# Patient Record
Sex: MALE | Race: WHITE | NOT HISPANIC OR LATINO | Employment: FULL TIME | URBAN - METROPOLITAN AREA
[De-identification: names, ages, dates, MRNs, and addresses within clinical notes are randomized per-mention and may not be internally consistent; named-entity substitution may affect disease eponyms.]

---

## 2020-03-05 ENCOUNTER — HOSPITAL ENCOUNTER (INPATIENT)
Facility: HOSPITAL | Age: 55
LOS: 2 days | Discharge: HOME | DRG: 315 | End: 2020-03-09
Attending: EMERGENCY MEDICINE | Admitting: FAMILY MEDICINE
Payer: COMMERCIAL

## 2020-03-05 ENCOUNTER — APPOINTMENT (EMERGENCY)
Dept: CT IMAGING | Facility: HOSPITAL | Age: 55
DRG: 315 | End: 2020-03-05
Payer: COMMERCIAL

## 2020-03-05 ENCOUNTER — APPOINTMENT (OUTPATIENT)
Dept: CT IMAGING | Facility: HOSPITAL | Age: 55
DRG: 315 | End: 2020-03-05
Payer: COMMERCIAL

## 2020-03-05 DIAGNOSIS — F10.10 ALCOHOL ABUSE: ICD-10-CM

## 2020-03-05 DIAGNOSIS — I42.9 CARDIOMYOPATHY (HCC): ICD-10-CM

## 2020-03-05 DIAGNOSIS — R55 SYNCOPE: Primary | ICD-10-CM

## 2020-03-05 DIAGNOSIS — F10.239 ALCOHOL WITHDRAWAL (HCC): ICD-10-CM

## 2020-03-05 DIAGNOSIS — R74.01 TRANSAMINITIS: ICD-10-CM

## 2020-03-05 DIAGNOSIS — R06.00 DYSPNEA: ICD-10-CM

## 2020-03-05 PROBLEM — S60.561A INSECT BITE OF RIGHT HAND: Status: ACTIVE | Noted: 2020-03-05

## 2020-03-05 PROBLEM — R42 LIGHT-HEADEDNESS: Status: ACTIVE | Noted: 2020-03-05
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
## 2020-03-05 LAB
ALBUMIN SERPL BCP-MCNC: 3.5 G/DL (ref 3.5–5)
ALP SERPL-CCNC: 59 U/L (ref 46–116)
ALT SERPL W P-5'-P-CCNC: 88 U/L (ref 12–78)
ANION GAP SERPL CALCULATED.3IONS-SCNC: 11 MMOL/L (ref 4–13)
AST SERPL W P-5'-P-CCNC: 130 U/L (ref 5–45)
ATRIAL RATE: 61 BPM
BASOPHILS # BLD AUTO: 0.12 THOUSANDS/ΜL (ref 0–0.1)
BASOPHILS NFR BLD AUTO: 3 % (ref 0–1)
BILIRUB SERPL-MCNC: 0.8 MG/DL (ref 0.2–1)
BUN SERPL-MCNC: 12 MG/DL (ref 5–25)
CALCIUM SERPL-MCNC: 8.1 MG/DL (ref 8.3–10.1)
CHLORIDE SERPL-SCNC: 107 MMOL/L (ref 100–108)
CO2 SERPL-SCNC: 28 MMOL/L (ref 21–32)
CREAT SERPL-MCNC: 0.7 MG/DL (ref 0.6–1.3)
D DIMER PPP FEU-MCNC: 1.26 UG/ML FEU
EOSINOPHIL # BLD AUTO: 0.16 THOUSAND/ΜL (ref 0–0.61)
EOSINOPHIL NFR BLD AUTO: 4 % (ref 0–6)
ERYTHROCYTE [DISTWIDTH] IN BLOOD BY AUTOMATED COUNT: 15 % (ref 11.6–15.1)
GFR SERPL CREATININE-BSD FRML MDRD: 109 ML/MIN/1.73SQ M
GLUCOSE SERPL-MCNC: 86 MG/DL (ref 65–140)
HCT VFR BLD AUTO: 41 % (ref 36.5–49.3)
HGB BLD-MCNC: 13.8 G/DL (ref 12–17)
IMM GRANULOCYTES # BLD AUTO: 0.01 THOUSAND/UL (ref 0–0.2)
IMM GRANULOCYTES NFR BLD AUTO: 0 % (ref 0–2)
LYMPHOCYTES # BLD AUTO: 1.61 THOUSANDS/ΜL (ref 0.6–4.47)
LYMPHOCYTES NFR BLD AUTO: 37 % (ref 14–44)
MCH RBC QN AUTO: 35.3 PG (ref 26.8–34.3)
MCHC RBC AUTO-ENTMCNC: 33.7 G/DL (ref 31.4–37.4)
MCV RBC AUTO: 105 FL (ref 82–98)
MONOCYTES # BLD AUTO: 0.37 THOUSAND/ΜL (ref 0.17–1.22)
MONOCYTES NFR BLD AUTO: 9 % (ref 4–12)
NEUTROPHILS # BLD AUTO: 2.1 THOUSANDS/ΜL (ref 1.85–7.62)
NEUTS SEG NFR BLD AUTO: 47 % (ref 43–75)
NRBC BLD AUTO-RTO: 0 /100 WBCS
NT-PROBNP SERPL-MCNC: 59 PG/ML
P AXIS: 63 DEGREES
PLATELET # BLD AUTO: 120 THOUSANDS/UL (ref 149–390)
PMV BLD AUTO: 10.1 FL (ref 8.9–12.7)
POTASSIUM SERPL-SCNC: 4.1 MMOL/L (ref 3.5–5.3)
PR INTERVAL: 156 MS
PROT SERPL-MCNC: 6.8 G/DL (ref 6.4–8.2)
QRS AXIS: 88 DEGREES
QRSD INTERVAL: 104 MS
QT INTERVAL: 458 MS
QTC INTERVAL: 461 MS
RBC # BLD AUTO: 3.91 MILLION/UL (ref 3.88–5.62)
SODIUM SERPL-SCNC: 146 MMOL/L (ref 136–145)
T WAVE AXIS: 30 DEGREES
TROPONIN I SERPL-MCNC: 0.02 NG/ML
TROPONIN I SERPL-MCNC: <0.02 NG/ML
TROPONIN I SERPL-MCNC: <0.02 NG/ML
TSH SERPL DL<=0.05 MIU/L-ACNC: 0.75 UIU/ML (ref 0.36–3.74)
VENTRICULAR RATE: 61 BPM
WBC # BLD AUTO: 4.37 THOUSAND/UL (ref 4.31–10.16)

## 2020-03-05 PROCEDURE — 93005 ELECTROCARDIOGRAM TRACING: CPT

## 2020-03-05 PROCEDURE — 84484 ASSAY OF TROPONIN QUANT: CPT | Performed by: EMERGENCY MEDICINE

## 2020-03-05 PROCEDURE — 84443 ASSAY THYROID STIM HORMONE: CPT | Performed by: EMERGENCY MEDICINE

## 2020-03-05 PROCEDURE — 99285 EMERGENCY DEPT VISIT HI MDM: CPT

## 2020-03-05 PROCEDURE — 85379 FIBRIN DEGRADATION QUANT: CPT | Performed by: EMERGENCY MEDICINE

## 2020-03-05 PROCEDURE — 99285 EMERGENCY DEPT VISIT HI MDM: CPT | Performed by: EMERGENCY MEDICINE

## 2020-03-05 PROCEDURE — 90471 IMMUNIZATION ADMIN: CPT | Performed by: INTERNAL MEDICINE

## 2020-03-05 PROCEDURE — 85025 COMPLETE CBC W/AUTO DIFF WBC: CPT | Performed by: EMERGENCY MEDICINE

## 2020-03-05 PROCEDURE — 84484 ASSAY OF TROPONIN QUANT: CPT | Performed by: PHYSICIAN ASSISTANT

## 2020-03-05 PROCEDURE — 36415 COLL VENOUS BLD VENIPUNCTURE: CPT | Performed by: EMERGENCY MEDICINE

## 2020-03-05 PROCEDURE — 99220 PR INITIAL OBSERVATION CARE/DAY 70 MINUTES: CPT | Performed by: PHYSICIAN ASSISTANT

## 2020-03-05 PROCEDURE — 71275 CT ANGIOGRAPHY CHEST: CPT

## 2020-03-05 PROCEDURE — 90682 RIV4 VACC RECOMBINANT DNA IM: CPT | Performed by: INTERNAL MEDICINE

## 2020-03-05 PROCEDURE — 80053 COMPREHEN METABOLIC PANEL: CPT | Performed by: EMERGENCY MEDICINE

## 2020-03-05 PROCEDURE — 93010 ELECTROCARDIOGRAM REPORT: CPT | Performed by: INTERNAL MEDICINE

## 2020-03-05 PROCEDURE — 83880 ASSAY OF NATRIURETIC PEPTIDE: CPT | Performed by: EMERGENCY MEDICINE

## 2020-03-05 RX ORDER — BISACODYL 10 MG
10 SUPPOSITORY, RECTAL RECTAL DAILY PRN
Status: DISCONTINUED | OUTPATIENT
Start: 2020-03-05 | End: 2020-03-09 | Stop reason: HOSPADM

## 2020-03-05 RX ORDER — ONDANSETRON 2 MG/ML
4 INJECTION INTRAMUSCULAR; INTRAVENOUS EVERY 6 HOURS PRN
Status: DISCONTINUED | OUTPATIENT
Start: 2020-03-05 | End: 2020-03-09 | Stop reason: HOSPADM

## 2020-03-05 RX ORDER — THIAMINE MONONITRATE (VIT B1) 100 MG
100 TABLET ORAL DAILY
Status: DISCONTINUED | OUTPATIENT
Start: 2020-03-06 | End: 2020-03-09 | Stop reason: HOSPADM

## 2020-03-05 RX ORDER — ALBUTEROL SULFATE 2.5 MG/3ML
2.5 SOLUTION RESPIRATORY (INHALATION) EVERY 6 HOURS PRN
Status: DISCONTINUED | OUTPATIENT
Start: 2020-03-05 | End: 2020-03-05

## 2020-03-05 RX ORDER — FOLIC ACID 1 MG/1
1 TABLET ORAL DAILY
Status: DISCONTINUED | OUTPATIENT
Start: 2020-03-06 | End: 2020-03-09 | Stop reason: HOSPADM

## 2020-03-05 RX ORDER — NICOTINE 21 MG/24HR
1 PATCH, TRANSDERMAL 24 HOURS TRANSDERMAL DAILY
Status: DISCONTINUED | OUTPATIENT
Start: 2020-03-06 | End: 2020-03-05

## 2020-03-05 RX ORDER — SODIUM CHLORIDE, SODIUM GLUCONATE, SODIUM ACETATE, POTASSIUM CHLORIDE, MAGNESIUM CHLORIDE, SODIUM PHOSPHATE, DIBASIC, AND POTASSIUM PHOSPHATE .53; .5; .37; .037; .03; .012; .00082 G/100ML; G/100ML; G/100ML; G/100ML; G/100ML; G/100ML; G/100ML
100 INJECTION, SOLUTION INTRAVENOUS CONTINUOUS
Status: DISCONTINUED | OUTPATIENT
Start: 2020-03-05 | End: 2020-03-09

## 2020-03-05 RX ORDER — MECLIZINE HYDROCHLORIDE 25 MG/1
25 TABLET ORAL EVERY 8 HOURS PRN
Status: DISCONTINUED | OUTPATIENT
Start: 2020-03-05 | End: 2020-03-09 | Stop reason: HOSPADM

## 2020-03-05 RX ORDER — NICOTINE 21 MG/24HR
21 PATCH, TRANSDERMAL 24 HOURS TRANSDERMAL DAILY
Status: DISCONTINUED | OUTPATIENT
Start: 2020-03-05 | End: 2020-03-09 | Stop reason: HOSPADM

## 2020-03-05 RX ORDER — CHLORDIAZEPOXIDE HYDROCHLORIDE 25 MG/1
25 CAPSULE, GELATIN COATED ORAL EVERY 6 HOURS SCHEDULED
Status: DISCONTINUED | OUTPATIENT
Start: 2020-03-06 | End: 2020-03-08

## 2020-03-05 RX ORDER — LORAZEPAM 1 MG/1
1 TABLET ORAL ONCE
Status: COMPLETED | OUTPATIENT
Start: 2020-03-05 | End: 2020-03-05

## 2020-03-05 RX ORDER — CALCIUM CARBONATE 200(500)MG
1000 TABLET,CHEWABLE ORAL DAILY PRN
Status: DISCONTINUED | OUTPATIENT
Start: 2020-03-05 | End: 2020-03-09 | Stop reason: HOSPADM

## 2020-03-05 RX ORDER — LORAZEPAM 0.5 MG/1
0.5 TABLET ORAL EVERY 8 HOURS PRN
Status: DISCONTINUED | OUTPATIENT
Start: 2020-03-05 | End: 2020-03-09 | Stop reason: HOSPADM

## 2020-03-05 RX ADMIN — NICOTINE 21 MG: 21 PATCH TRANSDERMAL at 22:07

## 2020-03-05 RX ADMIN — CHLORDIAZEPOXIDE HYDROCHLORIDE 25 MG: 25 CAPSULE ORAL at 23:33

## 2020-03-05 RX ADMIN — IOHEXOL 85 ML: 350 INJECTION, SOLUTION INTRAVENOUS at 18:57

## 2020-03-05 RX ADMIN — LORAZEPAM 0.5 MG: 0.5 TABLET ORAL at 22:07

## 2020-03-05 RX ADMIN — SODIUM CHLORIDE, SODIUM GLUCONATE, SODIUM ACETATE, POTASSIUM CHLORIDE AND MAGNESIUM CHLORIDE 100 ML/HR: 526; 502; 368; 37; 30 INJECTION, SOLUTION INTRAVENOUS at 22:02

## 2020-03-05 RX ADMIN — INFLUENZA A VIRUS A/BRISBANE/02/2018 (H1N1) RECOMBINANT HEMAGGLUTININ ANTIGEN, INFLUENZA A VIRUS A/KANSAS/14/2017 (H3N2) RECOMBINANT HEMAGGLUTININ ANTIGEN, INFLUENZA B VIRUS B/PHUKET/3073/2013 RECOMBINANT HEMAGGLUTININ ANTIGEN, AND INFLUENZA B VIRUS B/MARYLAND/15/2016 RECOMBINANT HEMAGGLUTININ ANTIGEN 0.5 ML: 45; 45; 45; 45 INJECTION INTRAMUSCULAR at 21:07

## 2020-03-05 RX ADMIN — LORAZEPAM 1 MG: 1 TABLET ORAL at 13:53

## 2020-03-05 NOTE — ED PROVIDER NOTES
History  Chief Complaint   Patient presents with    Syncope     Pt presents following syncopal episode after waking up this morning, he was at [facility] yesterday and was told he had a low platelet count  Pt reports feeling weak since Monday after a supposed spider bite  HPI     53-year-old male with no prior medical history but has not seen a doctor in 21 years, who presents for evaluation after an episode of syncope  The patient states that he suspects that he was bit by a spider 6 days ago and that all of his symptoms started after that  States that he woke up in the morning 6 days ago with 2 small puncture wounds to the dorsum of the right hand and swelling  He did not have any pain so he did not immediately seek medical care, but when the swelling was not better yesterday he went to [facility]  While there he underwent extensive laboratory workup and CT scan of his head which were negative  Patient was dizzy at the time, but states that since then he has been home he has been getting progressively more dizzy  Describes the dizziness as a feeling of lightheadedness, present with standing but also at rest   This morning when he got up from bed he walked into the bathroom, felt very lightheaded, and fell to his knees, briefly blacking out  Denies hitting his head or headache  Endorses bilateral blurry vision since the onset of his symptoms 6 days ago  No nausea, vomiting, or diarrhea  No blood in his stool or black tarry stools  Denies chest pain, palpitations, or abdominal pain  Patient does endorse shortness of breath that has been worse than usual over the last week, present both at rest and with exertion  He does smoke a pack and half of cigarettes a day  No known cardiac history or history of DVT or PE  None       History reviewed  No pertinent past medical history  History reviewed  No pertinent surgical history      Family History   Problem Relation Age of Onset    Hypertension Mother     Cancer Mother     Hypertension Father     Aortic aneurysm Father     Heart failure Father      I have reviewed and agree with the history as documented  E-Cigarette/Vaping     E-Cigarette/Vaping Substances     Social History     Tobacco Use    Smoking status: Current Every Day Smoker     Packs/day: 2 00     Years: 40 00     Pack years: 80 00     Types: Cigarettes    Smokeless tobacco: Never Used   Substance Use Topics    Alcohol use: Yes     Frequency: 4 or more times a week     Drinks per session: 5 or 6     Comment: 1/2 5th of vodka daily    Drug use: Not Currently       Review of Systems   Constitutional: Negative for chills and fever  HENT: Negative for congestion  Eyes: Positive for visual disturbance  Respiratory: Positive for shortness of breath  Negative for cough  Cardiovascular: Negative for chest pain and leg swelling  Gastrointestinal: Negative for abdominal pain, diarrhea, nausea and vomiting  Genitourinary: Negative for dysuria and frequency  Musculoskeletal: Negative for arthralgias, back pain, neck pain and neck stiffness  Skin: Negative for rash  Neurological: Positive for dizziness, syncope and light-headedness  Negative for facial asymmetry, speech difficulty, weakness, numbness and headaches  Psychiatric/Behavioral: Negative for agitation, behavioral problems and confusion  Physical Exam  Physical Exam   Constitutional: He is oriented to person, place, and time  He appears well-developed and well-nourished  No distress  HENT:   Head: Normocephalic and atraumatic  Right Ear: External ear normal    Left Ear: External ear normal    Nose: Nose normal    Mouth/Throat: Oropharynx is clear and moist    Eyes: Pupils are equal, round, and reactive to light  Conjunctivae and EOM are normal    Visual acuity 20/40 in the R eye, 20/30 in the L eye   Neck: Normal range of motion  Neck supple     Cardiovascular: Normal rate, regular rhythm, normal heart sounds and intact distal pulses  Exam reveals no gallop and no friction rub  No murmur heard  Pulmonary/Chest: Effort normal and breath sounds normal  No respiratory distress  He has no wheezes  He has no rales  Abdominal: Soft  Bowel sounds are normal  He exhibits no distension  There is no tenderness  There is no guarding  Musculoskeletal: Normal range of motion  He exhibits no edema (no calf swelling or tenderness) or deformity  Neurological: He is alert and oriented to person, place, and time  He exhibits normal muscle tone  Face symmetric, tongue midline, 5/5 strength in the proximal and distal upper and lower extremities bilaterally with intact sensation to light touch throughout  CN II-XII intact  Normal finger-to-nose, rapid alternating movements, and heel-to-shin bilaterally  Normal speech, normal gait  No pronator drift  Fine tremor to the bilateral arms  Skin: Skin is warm and dry  He is not diaphoretic    2 tiny puncture marks to the dorsum of the R hand, no swelling or erythema          Vital Signs  ED Triage Vitals [03/05/20 1249]   Temperature Pulse Respirations Blood Pressure SpO2   97 5 °F (36 4 °C) 72 16 128/75 98 %      Temp Source Heart Rate Source Patient Position - Orthostatic VS BP Location FiO2 (%)   Oral Monitor Sitting Left arm --      Pain Score       --           Vitals:    03/05/20 1402 03/05/20 1407 03/05/20 1830 03/05/20 1957   BP: 115/67 115/67 111/63 118/59   Pulse: (!) 52 57 64 56   Patient Position - Orthostatic VS:             Visual Acuity      ED Medications  Medications   LORazepam (ATIVAN) tablet 1 mg (1 mg Oral Given 3/5/20 1353)   iohexol (OMNIPAQUE) 350 MG/ML injection (MULTI-DOSE) 85 mL (85 mL Intravenous Given 3/5/20 1857)       Diagnostic Studies  Results Reviewed     Procedure Component Value Units Date/Time    Troponin I [[id number]]  (Normal) Collected:  03/05/20 1640    Lab Status:  Final result Specimen:  Blood from Arm, Left Updated: 03/05/20 1927     Troponin I <0 02 ng/mL     Troponin I [[id number]]     Lab Status:  No result Specimen:  Blood     D-dimer, quantitative [[id number]]  (Abnormal) Collected:  03/05/20 1445    Lab Status:  Final result Specimen:  Blood from Arm, Left Updated:  03/05/20 1506     D-Dimer, Quant 1 26 ug/ml FEU     NT-BNP PRO [[id number]]  (Normal) Collected:  03/05/20 1359    Lab Status:  Final result Specimen:  Blood from Arm, Right Updated:  03/05/20 1431     NT-proBNP 59 pg/mL     TSH [[id number]]  (Normal) Collected:  03/05/20 1359    Lab Status:  Final result Specimen:  Blood from Arm, Right Updated:  03/05/20 1431     TSH 3RD GENERATON 0 751 uIU/mL     Narrative:       Patients undergoing fluorescein dye angiography may retain small amounts of fluorescein in the body for 48-72 hours post procedure  Samples containing fluorescein can produce falsely depressed TSH values  If the patient had this procedure,a specimen should be resubmitted post fluorescein clearance        Troponin I [[id number]]  (Normal) Collected:  03/05/20 1359    Lab Status:  Final result Specimen:  Blood from Arm, Right Updated:  03/05/20 1424     Troponin I <0 02 ng/mL     Comprehensive metabolic panel [[id number]]  (Abnormal) Collected:  03/05/20 1359    Lab Status:  Final result Specimen:  Blood from Arm, Right Updated:  03/05/20 1422     Sodium 146 mmol/L      Potassium 4 1 mmol/L      Chloride 107 mmol/L      CO2 28 mmol/L      ANION GAP 11 mmol/L      BUN 12 mg/dL      Creatinine 0 70 mg/dL      Glucose 86 mg/dL      Calcium 8 1 mg/dL       U/L      ALT 88 U/L      Alkaline Phosphatase 59 U/L      Total Protein 6 8 g/dL      Albumin 3 5 g/dL      Total Bilirubin 0 80 mg/dL      eGFR 109 ml/min/1 73sq m     Narrative:       [place] guidelines for Chronic Kidney Disease (CKD):     Stage 1 with normal or high GFR (GFR > 90 mL/min/1 73 square meters)    Stage 2 Mild CKD (GFR = 60-89 mL/min/1 73 square meters)   Stage 3A Moderate CKD (GFR = 45-59 mL/min/1 73 square meters)    Stage 3B Moderate CKD (GFR = 30-44 mL/min/1 73 square meters)    Stage 4 Severe CKD (GFR = 15-29 mL/min/1 73 square meters)    Stage 5 End Stage CKD (GFR <15 mL/min/1 73 square meters)  Note: GFR calculation is accurate only with a steady state creatinine    CBC and differential [[id number]]  (Abnormal) Collected:  03/05/20 1359    Lab Status:  Final result Specimen:  Blood from Arm, Right Updated:  03/05/20 1409     WBC 4 37 Thousand/uL      RBC 3 91 Million/uL      Hemoglobin 13 8 g/dL      Hematocrit 41 0 %       fL      MCH 35 3 pg      MCHC 33 7 g/dL      RDW 15 0 %      MPV 10 1 fL      Platelets 629 Thousands/uL      nRBC 0 /100 WBCs      Neutrophils Relative 47 %      Immat GRANS % 0 %      Lymphocytes Relative 37 %      Monocytes Relative 9 %      Eosinophils Relative 4 %      Basophils Relative 3 %      Neutrophils Absolute 2 10 Thousands/µL      Immature Grans Absolute 0 01 Thousand/uL      Lymphocytes Absolute 1 61 Thousands/µL      Monocytes Absolute 0 37 Thousand/µL      Eosinophils Absolute 0 16 Thousand/µL      Basophils Absolute 0 12 Thousands/µL                  CTA ED chest PE Study    (Results Pending)              Procedures  Procedures         ED Course         HEART Risk Score      Most Recent Value   Heart Score Risk Calculator   History  0 Filed at: 03/05/2020 1527   ECG  1 Filed at: 03/05/2020 1527   Age  1 Filed at: 03/05/2020 1527   Risk Factors  2 Filed at: 03/05/2020 1527   Troponin  0 Filed at: 03/05/2020 1527   HEART Score  4 Filed at: 03/05/2020 1527                      Wells' Criteria for PE      Most Recent Value   Wells' Criteria for PE   Clinical signs and symptoms of DVT  0 Filed at: 03/05/2020 1527   PE is primary diagnosis or equally likely  0 Filed at: 03/05/2020 1527   HR >100  0 Filed at: 03/05/2020 1527   Immobilization at least 3 days or Surgery in the previous 4 weeks  0 Filed at: 03/05/2020 1527 Previous, objectively diagnosed PE or DVT  0 Filed at: 03/05/2020 1527   Hemoptysis  0 Filed at: 03/05/2020 1527   Malignancy with treatment within 6 months or palliative  0 Filed at: 03/05/2020 1527   Wells' Criteria Total  0 Filed at: 03/05/2020 1527            [facility]  Number of Diagnoses or Management Options  Dyspnea: new and requires workup  Syncope: new and requires workup  Diagnosis management comments: Afebrile and HDS  Continues to report persistent lightheadedness  I personally interpreted the pt's EKG, which reveals normal rate, NSR, normal axis, normal intervals, Q waves in leads aVL, V1, and V2, with no prior available for comparison  No ST segment deviation  No evidence of WPW, brugada, or HOCM  Pt denies CP  Delta troponins undetectable, doubt acute ischemia  Neuro exam unremarkable, pt denies head strike today, CT head from outside hospital yesterday reviewed, with Krysta Bosch  Doubt intracranial process as the cause of his symptoms  Labs remarkable for transaminitis with AST>ALT, likely 2/2 to patient's chronic alcohol use  He is mildly tremulous on exam, no other evidence of alcohol withdrawal  1 mg of ativan given and pt placed on CIWA protocol  Given shortness of breath and lightheadedness, CT PE study ordered to r/o PE as the cause of the pt's symptoms  Pt admitted to medicine for obs for further evaluation of syncope with persistent lightheadedness            Amount and/or Complexity of Data Reviewed  Clinical lab tests: ordered and reviewed  Tests in the radiology section of CPT®: ordered  Discuss the patient with other providers: yes  Independent visualization of images, tracings, or specimens: yes    Patient Progress  Patient progress: stable        Disposition  Final diagnoses:   Syncope   Dyspnea     Time reflects when diagnosis was documented in both MDM as applicable and the Disposition within this note     Time User Action Codes Description Comment    3/5/2020  6:41 PM Guy Aguiar Add [R55] Syncope     3/5/2020  6:41 PM Hollis DE LA TORRE Add [R06 00] Dyspnea       ED Disposition     ED Disposition Condition Date/Time Comment    Admit Stable Thu Mar 5, 2020  6:41 PM Case was discussed with Aam Montes De Oca and the patient's admission status was agreed to be Admission Status: observation status to the service of Dr Ama Montes De Oca   Follow-up Information    None         There are no discharge medications for this patient  No discharge procedures on file      PDMP Review     None          ED Provider  Electronically Signed by           Dong Callahan MD  03/05/20 2010       Dong Callahan MD  03/05/20 2020

## 2020-03-06 ENCOUNTER — APPOINTMENT (OUTPATIENT)
Dept: ULTRASOUND IMAGING | Facility: HOSPITAL | Age: 55
DRG: 315 | End: 2020-03-06
Payer: COMMERCIAL

## 2020-03-06 LAB
ALBUMIN SERPL BCP-MCNC: 3 G/DL (ref 3.5–5)
ALP SERPL-CCNC: 63 U/L (ref 46–116)
ALT SERPL W P-5'-P-CCNC: 73 U/L (ref 12–78)
ANION GAP SERPL CALCULATED.3IONS-SCNC: 8 MMOL/L (ref 4–13)
AST SERPL W P-5'-P-CCNC: 92 U/L (ref 5–45)
BASOPHILS # BLD AUTO: 0.09 THOUSANDS/ΜL (ref 0–0.1)
BASOPHILS NFR BLD AUTO: 2 % (ref 0–1)
BILIRUB DIRECT SERPL-MCNC: 0.32 MG/DL (ref 0–0.2)
BILIRUB SERPL-MCNC: 1.6 MG/DL (ref 0.2–1)
BUN SERPL-MCNC: 15 MG/DL (ref 5–25)
CALCIUM SERPL-MCNC: 8.5 MG/DL (ref 8.3–10.1)
CHLORIDE SERPL-SCNC: 102 MMOL/L (ref 100–108)
CO2 SERPL-SCNC: 29 MMOL/L (ref 21–32)
CREAT SERPL-MCNC: 0.84 MG/DL (ref 0.6–1.3)
EOSINOPHIL # BLD AUTO: 0.18 THOUSAND/ΜL (ref 0–0.61)
EOSINOPHIL NFR BLD AUTO: 4 % (ref 0–6)
ERYTHROCYTE [DISTWIDTH] IN BLOOD BY AUTOMATED COUNT: 14.3 % (ref 11.6–15.1)
GFR SERPL CREATININE-BSD FRML MDRD: 101 ML/MIN/1.73SQ M
GLUCOSE SERPL-MCNC: 95 MG/DL (ref 65–140)
HCT VFR BLD AUTO: 38.7 % (ref 36.5–49.3)
HGB BLD-MCNC: 13.4 G/DL (ref 12–17)
IMM GRANULOCYTES # BLD AUTO: 0.02 THOUSAND/UL (ref 0–0.2)
IMM GRANULOCYTES NFR BLD AUTO: 1 % (ref 0–2)
LYMPHOCYTES # BLD AUTO: 1.05 THOUSANDS/ΜL (ref 0.6–4.47)
LYMPHOCYTES NFR BLD AUTO: 24 % (ref 14–44)
MAGNESIUM SERPL-MCNC: 1.6 MG/DL (ref 1.6–2.6)
MCH RBC QN AUTO: 35.6 PG (ref 26.8–34.3)
MCHC RBC AUTO-ENTMCNC: 34.6 G/DL (ref 31.4–37.4)
MCV RBC AUTO: 103 FL (ref 82–98)
MONOCYTES # BLD AUTO: 0.6 THOUSAND/ΜL (ref 0.17–1.22)
MONOCYTES NFR BLD AUTO: 14 % (ref 4–12)
NEUTROPHILS # BLD AUTO: 2.48 THOUSANDS/ΜL (ref 1.85–7.62)
NEUTS SEG NFR BLD AUTO: 55 % (ref 43–75)
NRBC BLD AUTO-RTO: 0 /100 WBCS
PLATELET # BLD AUTO: 109 THOUSANDS/UL (ref 149–390)
PMV BLD AUTO: 10.5 FL (ref 8.9–12.7)
POTASSIUM SERPL-SCNC: 3.9 MMOL/L (ref 3.5–5.3)
PROT SERPL-MCNC: 6.2 G/DL (ref 6.4–8.2)
RBC # BLD AUTO: 3.76 MILLION/UL (ref 3.88–5.62)
SODIUM SERPL-SCNC: 139 MMOL/L (ref 136–145)
WBC # BLD AUTO: 4.42 THOUSAND/UL (ref 4.31–10.16)

## 2020-03-06 PROCEDURE — 99244 OFF/OP CNSLTJ NEW/EST MOD 40: CPT | Performed by: INTERNAL MEDICINE

## 2020-03-06 PROCEDURE — 93880 EXTRACRANIAL BILAT STUDY: CPT

## 2020-03-06 PROCEDURE — 94762 N-INVAS EAR/PLS OXIMTRY CONT: CPT

## 2020-03-06 PROCEDURE — 99225 PR SBSQ OBSERVATION CARE/DAY 25 MINUTES: CPT | Performed by: FAMILY MEDICINE

## 2020-03-06 PROCEDURE — 97163 PT EVAL HIGH COMPLEX 45 MIN: CPT

## 2020-03-06 PROCEDURE — 83735 ASSAY OF MAGNESIUM: CPT | Performed by: PHYSICIAN ASSISTANT

## 2020-03-06 PROCEDURE — 80053 COMPREHEN METABOLIC PANEL: CPT | Performed by: PHYSICIAN ASSISTANT

## 2020-03-06 PROCEDURE — 82248 BILIRUBIN DIRECT: CPT | Performed by: FAMILY MEDICINE

## 2020-03-06 PROCEDURE — 85025 COMPLETE CBC W/AUTO DIFF WBC: CPT | Performed by: PHYSICIAN ASSISTANT

## 2020-03-06 RX ORDER — HEPARIN SODIUM 5000 [USP'U]/ML
5000 INJECTION, SOLUTION INTRAVENOUS; SUBCUTANEOUS EVERY 8 HOURS SCHEDULED
Status: DISCONTINUED | OUTPATIENT
Start: 2020-03-06 | End: 2020-03-09 | Stop reason: HOSPADM

## 2020-03-06 RX ORDER — ACETAMINOPHEN 325 MG/1
650 TABLET ORAL EVERY 6 HOURS PRN
Status: DISCONTINUED | OUTPATIENT
Start: 2020-03-06 | End: 2020-03-09 | Stop reason: HOSPADM

## 2020-03-06 RX ADMIN — HEPARIN SODIUM 5000 UNITS: 5000 INJECTION INTRAVENOUS; SUBCUTANEOUS at 21:33

## 2020-03-06 RX ADMIN — Medication 1 TABLET: at 08:34

## 2020-03-06 RX ADMIN — SODIUM CHLORIDE, SODIUM GLUCONATE, SODIUM ACETATE, POTASSIUM CHLORIDE AND MAGNESIUM CHLORIDE 100 ML/HR: 526; 502; 368; 37; 30 INJECTION, SOLUTION INTRAVENOUS at 06:37

## 2020-03-06 RX ADMIN — LORAZEPAM 0.5 MG: 0.5 TABLET ORAL at 21:33

## 2020-03-06 RX ADMIN — CHLORDIAZEPOXIDE HYDROCHLORIDE 25 MG: 25 CAPSULE ORAL at 04:57

## 2020-03-06 RX ADMIN — NICOTINE 21 MG: 21 PATCH TRANSDERMAL at 21:33

## 2020-03-06 RX ADMIN — CHLORDIAZEPOXIDE HYDROCHLORIDE 25 MG: 25 CAPSULE ORAL at 17:06

## 2020-03-06 RX ADMIN — THIAMINE HCL TAB 100 MG 100 MG: 100 TAB at 08:34

## 2020-03-06 RX ADMIN — LORAZEPAM 0.5 MG: 0.5 TABLET ORAL at 08:34

## 2020-03-06 RX ADMIN — CHLORDIAZEPOXIDE HYDROCHLORIDE 25 MG: 25 CAPSULE ORAL at 12:06

## 2020-03-06 RX ADMIN — FOLIC ACID 1 MG: 1 TABLET ORAL at 08:34

## 2020-03-06 RX ADMIN — HEPARIN SODIUM 5000 UNITS: 5000 INJECTION INTRAVENOUS; SUBCUTANEOUS at 17:36

## 2020-03-06 NOTE — ASSESSMENT & PLAN NOTE
· AST//88 on admission  Denies any abdominal pain or N/V   Likely 2/2 chronic alcoholism  · Repeat CMP in am   · IVF

## 2020-03-06 NOTE — PLAN OF CARE
Problem: PHYSICAL THERAPY ADULT  Goal: Performs mobility at highest level of function for planned discharge setting  See evaluation for individualized goals  Description  Treatment/Interventions: Functional transfer training, LE strengthening/ROM, Elevations, Therapeutic exercise, Endurance training, Patient/family training, Equipment eval/education, Bed mobility, Gait training, Spoke to nursing, Family          See flowsheet documentation for full assessment, interventions and recommendations  Note:   Prognosis: Good  Problem List: Decreased strength, Decreased endurance, Impaired balance, Decreased mobility  Assessment: pt is a 50y/o m who [facility]p [facility]opal episode  pending cardiology c/s  PMH significant for alcohol abuse, smoker, + transaminitis  at baseline, pt (I) c functional mobility tasks s AD  resides c spouse in ranch home c [place]  currently requires min (A)x1 to complete OOB mobility tasks 2* deficits in strength, balance, gait quality, + activity tolerance noted in PT exam above  Barthel Index 60/100  pt slightly unsteady upon standing requiring cues to prevent loss of balance  ambulated 30' c support of IV pole, however no overt loss of balance noted  demonstrated tremulous gait pattern  would benefit from skilled PT to maximize functional mobility + return home safely  upon d/c, recommend pt return home c family support + follow up c OPPT for balance once medically cleared by MD  PT eval of high complexity 2* unstable med status c pt requiring ongoing medical management 2* syncope  pt c multiple co-morbidities + mobility deficits above requiring (A)x1 to complete OOB mobility tasks  pt tremulous c ambulation increasing fall risk  also pending cardiology c/s  Barriers to Discharge: Inaccessible home environment     Recommendation: Outpatient PT, Home with family support          See flowsheet documentation for full assessment

## 2020-03-06 NOTE — ASSESSMENT & PLAN NOTE
Doubt it was an insect bite  Clinically, appears like a minor dermatitis related rash which is currently resolved  No cellulitis

## 2020-03-06 NOTE — PHYSICAL THERAPY NOTE
PT Evaluation (15min)  (14:10-14:25)    History reviewed  No pertinent past medical history  03/06/20 1416   Note Type   Note type Eval only   Pain Assessment   Pain Assessment No/denies pain   Home Living   Type of [address] One level;Stairs to enter without rails  ([place])   Bathroom Shower/Tub Walk-in shower   Bathroom Toilet Standard   Prior Function   Level of [place] Independent with ADLs and functional mobility   Lives With Spouse   Receives Help From Family   ADL Assistance Independent   IADLs Independent   Falls in the last 6 months 0   Vocational Full time employment   Comments (+) drives   Restrictions/Precautions   Weight Bearing Precautions Per Order No   Other Precautions Multiple lines;Telemetry; Fall Risk   General   Additional Pertinent History pt presents to [facility] s/p near syncopal episode while ambulating at home  pending cardiology c/s  PT consulted for mobility + d/c planning  up c (A)  Family/Caregiver Present Yes  (pt's wife)   Cognition   Orientation Level Oriented X4   RUE Assessment   RUE Assessment WFL  (4/5)   LUE Assessment   LUE Assessment WFL  (4/5)   RLE Assessment   RLE Assessment WFL  (4/5)   LLE Assessment   LLE Assessment WFL  (4/5)   Coordination   Movements are Fluid and Coordinated 1   Sensation WFL   Bed Mobility   Supine to Sit 5  Supervision   Additional items HOB elevated; Increased time required;Verbal cues   Sit to Supine 5  Supervision   Additional items Increased time required;Verbal cues   Transfers   Sit to Stand 4  Minimal assistance   Additional items Assist x 1;Verbal cues; Increased time required  (slightly unsteady upon standing)   Stand to Sit 4  Minimal assistance   Additional items Assist x 1;Verbal cues; Increased time required   Ambulation/Elevation   Gait pattern Narrow OLLIE; Decreased foot clearance; Inconsistent wendy  (unsteady/tremulous)   Gait Assistance 4  Minimal assist   Additional items Assist x 1;Verbal cues   Assistive Device Other (Comment)  (IV pole)   Distance 30'   Stair Management Assistance Not tested   Balance   Static Sitting Fair +   Dynamic Sitting Fair +   Static Standing Fair -   Dynamic Standing Poor +   Ambulatory Poor +   Activity Tolerance   Activity Tolerance Patient limited by fatigue   Nurse Made Aware CHATA Thompson aware pt ambulated (A)x1 c IV pole  pt mildly unsteady  Assessment   Prognosis Good   Problem List Decreased strength;Decreased endurance; Impaired balance;Decreased mobility   Assessment pt is a 48y/o m who presents to [facility] s/p syncopal episode  pending cardiology c/s  PMH significant for alcohol abuse, smoker, + transaminitis  at baseline, pt (I) c functional mobility tasks s AD  resides c spouse in ranch home c [place]  currently requires min (A)x1 to complete OOB mobility tasks 2* deficits in strength, balance, gait quality, + activity tolerance noted in PT exam above  Barthel Index 60/100  pt slightly unsteady upon standing requiring cues to prevent loss of balance  ambulated 30' c support of IV pole, however no overt loss of balance noted  demonstrated tremulous gait pattern  would benefit from skilled PT to maximize functional mobility + return home safely  upon d/c, recommend pt return home c family support + follow up c OPPT for balance once medically cleared by MD  PT eval of high complexity 2* unstable med status c pt requiring ongoing medical management 2* syncope  pt c multiple co-morbidities + mobility deficits above requiring (A)x1 to complete OOB mobility tasks  pt tremulous c ambulation increasing fall risk  also pending cardiology c/s  Barriers to Discharge Inaccessible home environment   Goals   Patient Goals "to go hunting"  STG Expiration Date 03/16/20   Short Term Goal #1 1   increase strength 1/2 grade to improve overall functional mobility, 2  perform bed mobility mod (I) to decrease caregiver burden, 3  perform transfers mod (I) to safely perform ADLs, 4  ambulate >300' mod (I)/(I) s overt loss of balance c no or least restrictive AD to safely navigate in community + return to work, 5  negotiate 2 stairs mod (I) to safely enter home   Plan   Treatment/Interventions Functional transfer training;LE strengthening/ROM; Elevations; Therapeutic exercise; Endurance training;Patient/family training;Equipment eval/education; Bed mobility;Gait training;Spoke to nursing;Family   PT Frequency 5x/wk   Recommendation   Recommendation Outpatient PT; Home with family support   Modified [place] Scale   Modified [place] Scale 4   Barthel Index   Feeding 10   Bathing 0   Grooming Score 5   Dressing Score 10   Bladder Score 10   Bowels Score 10   Toilet Use Score 5   Transfers (Bed/Chair) Score 10   Mobility (Level Surface) Score 0   Stairs Score 0   Barthel Index Score 60     Corie Ba, PT, DPT

## 2020-03-06 NOTE — PLAN OF CARE
Problem: PAIN - ADULT  Goal: Verbalizes/displays adequate comfort level or baseline comfort level  Description  Interventions:  - Encourage patient to monitor pain and request assistance  - Assess pain using appropriate pain scale  - Administer analgesics based on type and severity of pain and evaluate response  - Implement non-pharmacological measures as appropriate and evaluate response  - Consider cultural and social influences on pain and pain management  - Notify physician/advanced practitioner if interventions unsuccessful or patient reports new pain  Outcome: Progressing     Problem: INFECTION - ADULT  Goal: Absence or prevention of progression during hospitalization  Description  INTERVENTIONS:  - Assess and monitor for signs and symptoms of infection  - Monitor lab/diagnostic results  - Monitor all insertion sites, i e  indwelling lines, tubes, and drains  - Monitor endotracheal if appropriate and nasal secretions for changes in amount and color  - [place] appropriate cooling/warming therapies per order  - Administer medications as ordered  - Instruct and encourage patient and family to use good hand hygiene technique  - Identify and instruct in appropriate isolation precautions for identified infection/condition  Outcome: Progressing  Goal: Absence of fever/infection during neutropenic period  Description  INTERVENTIONS:  - Monitor WBC    Outcome: Progressing     Problem: SAFETY ADULT  Goal: Patient will remain free of falls  Description  INTERVENTIONS:  - Assess patient frequently for physical needs  -  Identify cognitive and physical deficits and behaviors that affect risk of falls    -  [place] fall precautions as indicated by assessment   - Educate patient/family on patient safety including physical limitations  - Instruct patient to call for assistance with activity based on assessment  - Modify environment to reduce risk of injury  - Consider OT/PT consult to assist with strengthening/mobility  Outcome: Progressing  Goal: Maintain or return to baseline ADL function  Description  INTERVENTIONS:  -  Assess patient's ability to carry out ADLs; assess patient's baseline for ADL function and identify physical deficits which impact ability to perform ADLs (bathing, care of mouth/teeth, toileting, grooming, dressing, etc )  - Assess/evaluate cause of self-care deficits   - Assess range of motion  - Assess patient's mobility; develop plan if impaired  - Assess patient's need for assistive devices and provide as appropriate  - Encourage maximum independence but intervene and supervise when necessary  - Involve family in performance of ADLs  - Assess for home care needs following discharge   - Consider OT consult to assist with ADL evaluation and planning for discharge  - Provide patient education as appropriate  Outcome: Progressing  Goal: Maintain or return mobility status to optimal level  Description  INTERVENTIONS:  - Assess patient's baseline mobility status (ambulation, transfers, stairs, etc )    - Identify cognitive and physical deficits and behaviors that affect mobility  - Identify mobility aids required to assist with transfers and/or ambulation (gait belt, sit-to-stand, lift, walker, cane, etc )  - [place] fall precautions as indicated by assessment  - Record patient progress and toleration of activity level on Mobility SBAR; progress patient to next Phase/Stage  - Instruct patient to call for assistance with activity based on assessment  - Consider rehabilitation consult to assist with strengthening/weightbearing, etc   Outcome: Progressing     Problem: DISCHARGE PLANNING  Goal: Discharge to home or other facility with appropriate resources  Description  INTERVENTIONS:  - Identify barriers to discharge w/patient and caregiver  - Arrange for needed discharge resources and transportation as appropriate  - Identify discharge learning needs (meds, wound care, etc )  - Arrange for interpretive services to assist at discharge as needed  - Refer to Case Management Department for coordinating discharge planning if the patient needs post-hospital services based on physician/advanced practitioner order or complex needs related to functional status, cognitive ability, or social support system  Outcome: Progressing     Problem: Knowledge Deficit  Goal: Patient/family/caregiver demonstrates understanding of disease process, treatment plan, medications, and discharge instructions  Description  Complete learning assessment and assess knowledge base  Interventions:  - Provide teaching at level of understanding  - Provide teaching via preferred learning methods  Outcome: Progressing     Problem: Potential for Falls  Goal: Patient will remain free of falls  Description  INTERVENTIONS:  - Assess patient frequently for physical needs  -  Identify cognitive and physical deficits and behaviors that affect risk of falls    -  [place] fall precautions as indicated by assessment   - Educate patient/family on patient safety including physical limitations  - Instruct patient to call for assistance with activity based on assessment  - Modify environment to reduce risk of injury  - Consider OT/PT consult to assist with strengthening/mobility  Outcome: Progressing

## 2020-03-06 NOTE — RESPIRATORY THERAPY NOTE
RT Protocol Note  Alison Michelle 48 y o  male MRN: [id number]  Unit/Bed#: -01 Encounter: [id number]    Assessment    Principal Problem:    Syncope  Active Problems:    Light-headedness    Insect bite of right hand    Transaminitis, mild    Alcohol abuse      Home Pulmonary Medications:    Home Devices/Therapy: (P) (no home resp therapy no pulm dx, pt smokes no copd wanted )    History reviewed  No pertinent past medical history    Social History     Socioeconomic History    Marital status: Single     Spouse name: None    Number of children: None    Years of education: None    Highest education level: None   Occupational History    None   Social Needs    Financial resource strain: None    Food insecurity:     Worry: None     Inability: None    Transportation needs:     Medical: None     Non-medical: None   Tobacco Use    Smoking status: Current Every Day Smoker     Packs/day: 2 00     Years: 40 00     Pack years: 80 00     Types: Cigarettes    Smokeless tobacco: Never Used   Substance and Sexual Activity    Alcohol use: Yes     Frequency: 4 or more times a week     Drinks per session: 5 or 6     Comment: 1/2 5th of vodka daily    Drug use: Not Currently    Sexual activity: None   Lifestyle    Physical activity:     Days per week: None     Minutes per session: None    Stress: None   Relationships    Social connections:     Talks on phone: None     Gets together: None     Attends Buddhist service: None     Active member of club or organization: None     Attends meetings of clubs or organizations: None     Relationship status: None    Intimate partner violence:     Fear of current or ex partner: None     Emotionally abused: None     Physically abused: None     Forced sexual activity: None   Other Topics Concern    None   Social History Narrative    None       Subjective         Objective    Physical Exam:   Assessment Type: (P) Assess only  General Appearance: (P) Alert, Awake  Respiratory Pattern: (P) Normal  Chest Assessment: (P) Chest expansion symmetrical  Bilateral Breath Sounds: (P) Diminished, Clear  Cough: (P) Strong, Dry, Non-productive  O2 Device: (P) ra     Vitals:  Blood pressure 118/59, pulse (P) 71, temperature 98 7 °F (37 1 °C), resp  rate (P) 16, height 5' 9" (1 753 m), weight 58 9 kg (129 lb 13 6 oz), SpO2 (P) 95 %  Imaging and other studies: I have personally reviewed pertinent reports        O2 Device: (P) ra      Plan    Respiratory Plan: (P) Discontinue Protocol        Resp Comments: (P) pt assessed for prn txs no txs indicated for discont txs, no resp therapy needed at this time pt on cpox on ra, pt smokes no dx pulm hx does not wasnt COPD/smoking hx declined

## 2020-03-06 NOTE — ASSESSMENT & PLAN NOTE
· Reports consumes approximately 1/2 of a 5th of Vodka daily  (+) hx of withdrawals   Intention tremors noted on exam  States last intake was at 0500 this am   · Received Ativan 1mg PO in ED  · Librium 25mg q6hrs scheduled  · CIWA protocol initiated  · Folic acid, Thiamine and MVI daily

## 2020-03-06 NOTE — CONSULTS
Consultation - Cardiology Team Jp Quiroga 48 y o  male MRN: [id number]  Unit/Bed#: -01 Encounter: [id number]    Inpatient consult to Cardiology  Consult performed by: MARCO Bang  Consult ordered by: Harshad Mcdaniel MD          Physician Requesting Consult: Harshad Mcdaniel MD  Reason for Consult / Principal Problem:  Syncope    Assessment/Plan:    1  Syncope  -carotid artery duplex normal  -check orthostatic blood pressures  -TTE ordered and pending  -continue IV hydration per [facility]    2  Chest pain  -c/o exertional chest tightness and shortness of breath  -check lipid panel in the morning  -patient to have pharmacological stress test performed on Monday  -TTE ordered and pending  -cardiac diet    3  Alcohol abuse  -cessation encouraged    4  Tobacco abuse  -cessation encouraged  -continue nicotine patch    HPI: 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 was discharged home  He states he has been having intermittent lightheadedness, dizziness and blurred vision since Monday  He does drink half of a 5th of vodka daily and states that he does not drink any other fluids  He denies any  palpitations, fever, chills, vomiting, diarrhea  He does also endorse exertional chest pain with shortness of breath especially while at work lifting and pushing    He states this has been ongoing for the last couple months  In the emergency room he was noted to have normal blood pressure, normal heart rate 60-80  Blood work was unremarkable except for elevated AST and total bilirubin  Troponins less than 0 02 x3, EKG with normal sinus rhythm and poor R-wave progression  CT of the chest negative except for mild COPD  He did have workup at [facility] with CT of the head which was negative, chest x-ray also negative and they did check a troponin that was ess than 0 03 x 2  REVIEW OF SYSTEMS:  Constitutional:  Denies fever or chills, +syncope   Eyes:  Denies change in visual acuity   HENT:  Denies nasal congestion or sore throat   Respiratory:  Denies cough, + shortness of breath   Cardiovascular:  + chest pain, denies edema   GI:  Denies abdominal pain, nausea, vomiting, bloody stools or diarrhea   :  Denies dysuria, frequency, difficulty in micturition and nocturia  Musculoskeletal:  Denies back pain or joint pain   Neurologic:  Denies headache, focal weakness or sensory changes   Endocrine:  Denies polyuria or polydipsia   Lymphatic:  Denies swollen glands   Psychiatric:  Denies depression or anxiety     Historical Information   History reviewed  No pertinent past medical history  History reviewed  No pertinent surgical history    Social History     Substance and Sexual Activity   Alcohol Use Yes    Frequency: 4 or more times a week    Drinks per session: 5 or 6    Comment: 1/2 5th of vodka daily     Social History     Substance and Sexual Activity   Drug Use Not Currently     Social History     Tobacco Use   Smoking Status Current Every Day Smoker    Packs/day: 2 00    Years: 40 00    Pack years: 80 00    Types: Cigarettes   Smokeless Tobacco Never Used       Family History:   Family History   Problem Relation Age of Onset    Hypertension Mother     Cancer Mother     Hypertension Father     Aortic aneurysm Father     Heart failure Father        MEDS & ALLERGIES:  all current active meds have been reviewed and current meds: Current Facility-Administered Medications   Medication Dose Route Frequency    bisacodyl (DULCOLAX) rectal suppository 10 mg  10 mg Rectal Daily PRN    calcium carbonate (TUMS) chewable tablet 1,000 mg  1,000 mg Oral Daily PRN    chlordiazePOXIDE (LIBRIUM) capsule 25 mg  25 mg Oral A2X [address]    folic acid (FOLVITE) tablet 1 mg  1 mg Oral Daily    LORazepam (ATIVAN) tablet 0 5 mg  0 5 mg Oral Q8H PRN    meclizine (ANTIVERT) tablet 25 mg  25 mg Oral Q8H PRN    multi-electrolyte (PLASMALYTE-A/ISOLYTE-S PH 7 4) IV solution  100 mL/hr Intravenous Continuous    multivitamin-minerals (CENTRUM) tablet 1 tablet  1 tablet Oral Daily    nicotine (NICODERM CQ) 21 mg/24 hr TD 24 hr patch 21 mg  21 mg Transdermal Daily    ondansetron (ZOFRAN) injection 4 mg  4 mg Intravenous Q6H PRN    thiamine (VITAMIN B1) tablet 100 mg  100 mg Oral Daily       multi-electrolyte 100 mL/hr Last Rate: 100 mL/hr (20 2160)     No Known Allergies    OBJECTIVE:  Vitals:   Vitals:    20 1206   BP: 127/68   Pulse: 66   Resp:    Temp:    SpO2:      Body mass index is 19 18 kg/m²  Systolic (08QSU), VGU:177 , Min:111 , VC     Diastolic (39IBU), DQX:86, Min:59, Max:72      Intake/Output Summary (Last 24 hours) at 3/6/2020 1445  Last data filed at 3/6/2020 1300  Gross per 24 hour   Intake 840 ml   Output 475 ml   Net 365 ml     Weight (last 2 days)     Date/Time   Weight    20 19:57:51   58 9 (129 85)    20 1249   58 8 (129 6)            Invasive Devices     Peripheral Intravenous Line            Peripheral IV 20 Right Antecubital 1 day                PHYSICAL EXAMS:  General:  Patient is not in acute distress, laying in the bed comfortably, awake, alert responding to commands  Head: Normocephalic, Atraumatic     HEENT: White sclera, pink conjunctiva  Neck:  Supple, no neck vein distention  Respiratory: clear to P/A  Cardiovascular:  PMI normal, S1-S2 normal, No  Murmurs, thrills, gallops, rubs, regular rhythm  GI:  Abdomen soft, non-tender, non-distended  Extremities: No edema, normal pulses  Integument:  No skin rashes or ulceration  Lymphatic:  No cervical or inguinal lymphadenopathy  Neurologic:  Patient is awake alert, responding to command, oriented to person, place and time     LABORATORY RESULTS:  Results from last 7 days   Lab Units 03/05/20  2124 03/05/20  1640 03/05/20  1359   TROPONIN I ng/mL 0 02 <0 02 <0 02     CBC with diff: Results from last 7 days   Lab Units 03/06/20  0450 03/05/20  1359   WBC Thousand/uL 4 42 4 37   HEMOGLOBIN g/dL 13 4 13 8   HEMATOCRIT % 38 7 41 0   MCV fL 103* 105*   PLATELETS Thousands/uL 109* 120*   MCH pg 35 6* 35 3*   MCHC g/dL 34 6 33 7   RDW % 14 3 15 0   MPV fL 10 5 10 1   NRBC AUTO /100 WBCs 0 0       CMP:  Results from last 7 days   Lab Units 03/06/20  0450 03/05/20  1359   POTASSIUM mmol/L 3 9 4 1   CHLORIDE mmol/L 102 107   CO2 mmol/L 29 28   BUN mg/dL 15 12   CREATININE mg/dL 0 84 0 70   CALCIUM mg/dL 8 5 8 1*   AST U/L 92* 130*   ALT U/L 73 88*   ALK PHOS U/L 63 59   EGFR ml/min/1 73sq m 101 109       BMP:  Results from last 7 days   Lab Units 03/06/20  0450 03/05/20  1359   POTASSIUM mmol/L 3 9 4 1   CHLORIDE mmol/L 102 107   CO2 mmol/L 29 28   BUN mg/dL 15 12   CREATININE mg/dL 0 84 0 70   CALCIUM mg/dL 8 5 8 1*       Results from last 7 days   Lab Units 03/05/20  1359   NT-PRO BNP pg/mL 59      Results from last 7 days   Lab Units 03/06/20  0450   MAGNESIUM mg/dL 1 6         Results from last 7 days   Lab Units 03/05/20  1359   TSH 3RD GENERATON uIU/mL 0 751           Lipid Profile:   No results found for: CHOL  No results found for: HDL  No results found for: LDLCALC  No results found for: TRIG    Cardiac testing:   No results found for this or any previous visit  No results found for this or any previous visit  No procedure found    No results found for this or any previous visit       Imaging:   I have personally reviewed pertinent reports  EKG reviewed personally:  Normal sinus rhythm with poor R-wave progression      Telemetry reviewed personally:   Sinus rhythm in the 50s      Code Status: Level 1 - Full Code    Counseling / Coordination of Care  Total floor / unit time spent today 15 minutes  Greater than 50% of total time was spent with the patient and / or family counseling and / or coordination of care  A description of the counseling / coordination of care: Review of history, current assessment, development of a plan      [address] [place] [address]  3/6/2020,2:45 PM

## 2020-03-06 NOTE — ASSESSMENT & PLAN NOTE
· Reports consumes approximately 1/2 of a 5th of Vodka daily  (+) hx of withdrawals   Intention tremors noted on exam  States last intake was at 0500 this am   · Received Ativan 1mg PO in ED  · Librium 25mg q6hrs scheduled  · CIWA protocol initiated, but patient has not required CIWA medication yet  · Folic acid, Thiamine and MVI daily

## 2020-03-06 NOTE — ED NOTES
1  CC syncope  2  Is this admission r/t an injury? no  3  Orientation status alert and oriented  4  Abnormal labs, assessment, vitals d-dimer 1 26   5  Medication/ drips  6  Last time narcotics given ativan 1mg 1353  7  IV lines, drains, etc  20 g right ac  8  Isolation status no  9  Skin clear   10  Ambulation status self  11   ED RN phone number [facility]  03/05/20 [id number]

## 2020-03-06 NOTE — ASSESSMENT & PLAN NOTE
· Reports 3 episodes on pre-syncope yesterday (3/4/20) was seen at [facility] and discharged home  This morning, around 0800, he began feeling weak and was holding onto the wall after which S O witnessed him fall to his hands and knees  S O states he briefly lost consciousness and "eyes rolled back " Reports has been having light-headedness, dizziness and blurred vision intermittently since Monday  · CT head at [facility] (-)  · Troponin (-) x 2 at [facility] yesterday and initial troponin here (-)  Will trend x 3   · 12 lead EKG reveals SR w/ poor R wave progression with no acute ischemic changes    · CTA chest results pending  · Carotid doppler pending  · Check orthostatic BP and HR  · Isolyte at 100mL/hr

## 2020-03-06 NOTE — ASSESSMENT & PLAN NOTE
· Improved  Patient did well with physical therapy  · Do not suspect vertigo, however, most likely this is related to dehydration due to heavy alcohol use  · Encouraged patient to quit drinking  · Also, need to rule out cardiac etiology    ·

## 2020-03-06 NOTE — ASSESSMENT & PLAN NOTE
· Reports 3 episodes on pre-syncope yesterday (3/4/20) was seen at [facility] and discharged home  This morning, around 0800, he began feeling weak and was holding onto the wall after which S O witnessed him fall to his hands and knees  S O states he briefly lost consciousness and "eyes rolled back " Reports has been having light-headedness, dizziness and blurred vision intermittently since Monday  · CT head at [facility] (-)  · Troponin negative x3  ·  12 lead EKG reveals SR w/ poor R wave progression with no acute ischemic changes  · CTA chest:  Revealed mild COPD  · Carotid doppler was normal  · Check orthostatic BP and HR  · Continue with Isolyte at 100mL/hr  · Due to abnormal EKG and patient reporting recurrent exertional chest pain, recommend cardiology evaluation with possible workup such as Holter monitor versus 2D echo

## 2020-03-06 NOTE — UTILIZATION REVIEW
Initial Clinical Review    Admission: Date/Time/Statement: Admission Orders (From admission, onward)     Ordered        03/05/20 1841  Place in Observation  Once                   Orders Placed This Encounter   Procedures    Place in Observation     Standing Status:   Standing     Number of Occurrences:   1     Order Specific Question:   Admitting Physician     Answer:   Mike Christy [[id number]]     Order Specific Question:   Level of Care     Answer:   Med Surg [[id number]]     ED Arrival Information     Expected Arrival Acuity Means of Arrival Escorted By Service Admission Type    - 3/5/2020 12:46 Urgent Walk-In Spouse General Medicine Urgent    Arrival Complaint    Syncope,blurred vision         Chief Complaint   Patient presents with    Syncope     Pt presents following syncopal episode after waking up this morning, he was at [facility] yesterday and was told he had a low platelet count  Pt reports feeling weak since Monday after a supposed spider bite  Assessment/Plan: 48year old male to the ED from home with complaints of syncopal episode, dizziness, lightheaded  Admitted under observation for syncope  6 days prior, he awoke with a presumed insect bite and swelling of right hand  A day prior to his arrival to ED, was seen at another ED, had extensive blood work and CT of head which were negative  Dizziness has been getting progressively worse  ON the day of his arrival, he stood up, was walking, became so lightheaded, he lowered himself to the ground and blacked out  Has had blurry vision for 6 days  Visual acuity 20/40 in the R eye, 20/30 in the L eye   GCS=15  Strength strong and symmetric  Noted to have 2 tiny puncture marks to right hand  Not swollen or red  INitial troponin -, continue to trend  CT head - at other hospital    Check carotid doppler  Check orthstatic vs   IV fluids initiated  CTA chest   H/o alcohol abuse with h/o withdrawals  Has tremors on exam   Given Ativan in ED  CIWA protocol  Mild transaminitis  ED Triage Vitals   Temperature Pulse Respirations Blood Pressure SpO2   03/05/20 1249 03/05/20 1249 03/05/20 1249 03/05/20 1249 03/05/20 1249   97 5 °F (36 4 °C) 72 16 128/75 98 %      Temp Source Heart Rate Source Patient Position - Orthostatic VS BP Location FiO2 (%)   03/05/20 1249 03/05/20 1249 03/05/20 1249 03/05/20 1249 --   Oral Monitor Sitting Left arm       Pain Score       03/05/20 2103       No Pain        Wt Readings from Last 1 Encounters:   03/05/20 58 9 kg (129 lb 13 6 oz)     Additional Vital Signs:   Date/Time  Temp  Pulse  Resp  BP  MAP (mmHg)  SpO2  O2 Device  Patient Position - Orthostatic VS   03/06/20 0748              None (Room air)     03/06/20 07:24:56  98 5 °F (36 9 °C)  64  18  131/72  92  96 %       03/06/20 03:04:56  98 3 °F (36 8 °C)  73  15  128/65  86  94 %  None (Room air)  Lying   03/06/20 0300            95 %  None (Room air)     03/05/20 2300  99 2 °F (37 3 °C)  87  15  123/59  80  95 %  None (Room air)  Lying   03/05/20 2125    71  16      95 %       03/05/20 19:57:51  98 7 °F (37 1 °C)  56  19  118/59  79  94 %  None (Room air)     03/05/20 1830    64    111/63           03/05/20 1407    57  16  115/67           03/05/20 1402    52Abnormal     115/67           [facility] 1-5  Pertinent Labs/Diagnostic Test Results:   3/6 Carotid doppler:  CONCLUSION:  RIGHT:  There is no evidence of disease throughout the extracranial carotid arteries  Vertebral artery flow is antegrade  There is no significant subclavian artery disease  LEFT:  There is no evidence of disease throughout the extracranial carotid arteries  Vertebral artery flow is antegrade  There is no significant subclavian artery disease  3/5 EKG:  Normal sinus rhythm  Poor R wave progression  Possible Anterior infarct , age undetermined  Abnormal ECG  No previous ECGs available  3/5 CTA CHest:  No PE  Mild COPD with biapical pleural parenchymal scarring   No acute pulmonary process    Results from last 7 days   Lab Units 03/06/20  0450 03/05/20  1359   WBC Thousand/uL 4 42 4 37   HEMOGLOBIN g/dL 13 4 13 8   HEMATOCRIT % 38 7 41 0   PLATELETS Thousands/uL 109* 120*   NEUTROS ABS Thousands/µL 2 48 2 10         Results from last 7 days   Lab Units 03/06/20  0450 03/05/20  1359   SODIUM mmol/L 139 146*   POTASSIUM mmol/L 3 9 4 1   CHLORIDE mmol/L 102 107   CO2 mmol/L 29 28   ANION GAP mmol/L 8 11   BUN mg/dL 15 12   CREATININE mg/dL 0 84 0 70   EGFR ml/min/1 73sq m 101 109   CALCIUM mg/dL 8 5 8 1*   MAGNESIUM mg/dL 1 6  --      Results from last 7 days   Lab Units 03/06/20  0450 03/05/20  1359   AST U/L 92* 130*   ALT U/L 73 88*   ALK PHOS U/L 63 59   TOTAL PROTEIN g/dL 6 2* 6 8   ALBUMIN g/dL 3 0* 3 5   TOTAL BILIRUBIN mg/dL 1 60* 0 80         Results from last 7 days   Lab Units 03/06/20  0450 03/05/20  1359   GLUCOSE RANDOM mg/dL 95 86         Results from last 7 days   Lab Units 03/05/20  2124 03/05/20  1640 03/05/20  1359   TROPONIN I ng/mL 0 02 <0 02 <0 02     Results from last 7 days   Lab Units 03/05/20  1445   D-DIMER QUANTITATIVE ug/ml FEU 1 26*     Results from last 7 days   Lab Units 03/05/20  1359   TSH 3RD GENERATON uIU/mL 0 751     Results from last 7 days   Lab Units 03/05/20  1359   NT-PRO BNP pg/mL 59     ED Treatment:   Medication Administration from 03/05/2020 1246 to 03/05/2020 1952       Date/Time Order Dose Route Action     03/05/2020 1353 LORazepam (ATIVAN) tablet 1 mg 1 mg Oral Given        Admitting Diagnosis: Syncope [R55]  Dyspnea [R06 00]  Age/Sex: 48 y o  male  Admission Orders:  Neuro checks every 4 hours  Tele  Bilirubin direct  Scheduled Medications:    Medications:  chlordiazePOXIDE 25 mg Oral I0L [address]   folic acid 1 mg Oral Daily   multivitamin-minerals 1 tablet Oral Daily   nicotine 21 mg Transdermal Daily   thiamine 100 mg Oral Daily     Continuous IV Infusions:    multi-electrolyte 100 mL/hr Intravenous Continuous     PRN Meds:    bisacodyl 10 mg Rectal Daily PRN   calcium carbonate 1,000 mg Oral Daily PRN   LORazepam 0 5 mg Oral Q8H PRN   meclizine 25 mg Oral Q8H PRN   ondansetron 4 mg Intravenous Q6H PRN       Network Utilization Review Department  [email or web address]  ATTENTION: Please call with any questions or concerns to [phone] and carefully listen to the prompts so that you are directed to the right person  All voicemails are confidential   Radha Jewell all requests for admission clinical reviews, approved or denied determinations and any other requests to dedicated fax number below belonging to the campus where the patient is receiving treatment   List of dedicated fax numbers for the Facilities:  [address] DENIALS (Administrative/Medical Necessity) [phone]   [address] (Maternity/NICU/Pediatrics) [phone]   [facility] [phone]   Talia Merrill [phone]   [facility] [phone]   Anders Buerger [phone]   [address] [phone]   [facility]  [phone]   [address] [facility]  [address] [facility] [address] [facility] [phone]

## 2020-03-06 NOTE — H&P
H&P- Roosevelt Whitney 1965, 48 y o  male MRN: [id number]    Unit/Bed#: -01 Encounter: [id number]    Primary Care Provider: No primary care provider on file  Date and time admitted to hospital: 3/5/2020  1:18 PM        * Syncope  Assessment & Plan  · Reports 3 episodes on pre-syncope yesterday (3/4/20) was seen at [facility] and discharged home  This morning, around 0800, he began feeling weak and was holding onto the wall after which S O witnessed him fall to his hand and knees  S O states he briefly lost consciousness and "eyes rolled back " Reports has been having light-headedness, dizziness and blurred vision intermittently since Monday  · CT head at [facility] (-)  · Troponin (-) x 2 at [facility] yesterday and initial troponin here (-)  Will trend x 3   · 12 lead EKG reveals SR w/ poor R wave progression with no acute ischemic changes  · CTA chest results pending  · Carotid doppler pending  · Check orthostatic BP and HR  · Isolyte at 100mL/hr    Light-headedness  Assessment & Plan  · See AP above  · Meclizine PRN    Alcohol abuse  Assessment & Plan  · Reports consumes approximately 1/2 of a 5th of Vodka daily  (+) hx of withdrawals  Intention tremors noted on exam  States last intake was at 0500 this am   · Received Ativan 1mg PO in ED  · Librium 25mg q6hrs scheduled  · CIWA protocol initiated  · Folic acid, Thiamine and MVI daily    Transaminitis, mild  Assessment & Plan  · AST//88 on admission  Denies any abdominal pain or N/V  Likely 2/2 chronic alcoholism  · Repeat CMP in am   · IVF    Insect bite of right hand  Assessment & Plan  · Reports presumed spider bite to Rt hand on Sunday evening  States hand became swollen on Monday but has since resolved  2 scabbed over puncture wounds noted to Rt hand consistent w/ spider bite  No s/s of infection currently  States current symptoms of light-headedness and blurred vision began the day after insect bite was first noted    · No intervention needed at this time       VTE Prophylaxis: Low risk  Ambulatory  / reason for no mechanical VTE prophylaxis Ambulatory   Code Status: Level 1 Full Code  POLST: POLST form is not discussed and not completed at this time  Discussion with family: NA    Anticipated Length of Stay:  Patient will be admitted on an Observation basis with an anticipated length of stay of  Less than 2 midnights  Justification for Hospital Stay: See AP above    Total Time for Visit, including Counseling / Coordination of Care: 45 minutes  Greater than 50% of this total time spent on direct patient counseling and coordination of care  Chief Complaint:   Syncope    History of Present Illness:    Niels Waite is a 48 y o  male who presents with after single episode of syncope that was witnessed by S O  Reports 3 episodes on pre-syncope yesterday (3/4/20) was seen at [facility] and discharged home  This morning, around 0800, he began feeling weak and was holding onto the wall after which S O witnessed him fall to his hands and knees  S O states he briefly lost consciousness and "eyes rolled back " Reports has been having light-headedness, dizziness and blurred vision intermittently since Monday  Chronic alcoholism  States drink 1/2 of a 5th of Vodka daily with (+) hx of alcohol withdrawals  Denies chest pain, palpitations or SOB  Review of Systems:    Review of Systems   Constitutional: Negative for appetite change, chills and fever  HENT: Negative for congestion, ear pain, sinus pressure and sore throat  Eyes: Positive for visual disturbance (blurred vision)  Respiratory: Negative for cough, shortness of breath and wheezing  Cardiovascular: Positive for palpitations  Negative for chest pain and leg swelling  Gastrointestinal: Negative for abdominal pain, constipation, diarrhea, nausea and vomiting  Genitourinary: Negative for difficulty urinating, dysuria and frequency  Musculoskeletal: Negative for neck pain and neck stiffness  Neurological: Positive for dizziness, syncope and light-headedness  Negative for headaches  Past Medical and Surgical History:     History reviewed  No pertinent past medical history  History reviewed  No pertinent surgical history  Meds/Allergies:    Prior to Admission medications    Not on File     I have reviewed home medications with patient personally  Allergies: No Known Allergies    Social History:     Marital Status: Single   Patient Pre-hospital Living Situation: Lives w/ S O  Patient Pre-hospital Level of Mobility: Ambulatory w/o assistive device  Patient Pre-hospital Diet Restrictions: None  Substance Use History:   Social History     Substance and Sexual Activity   Alcohol Use Yes    Frequency: 4 or more times a week    Drinks per session: 5 or 6    Comment: 1/2 5th of vodka daily     Social History     Tobacco Use   Smoking Status Current Every Day Smoker    Packs/day: 2 00    Years: 40 00    Pack years: 80 00    Types: Cigarettes   Smokeless Tobacco Never Used     Social History     Substance and Sexual Activity   Drug Use Not Currently       Family History:    Family History   Problem Relation Age of Onset    Hypertension Mother     Cancer Mother     Hypertension Father     Aortic aneurysm Father     Heart failure Father        Physical Exam:     Vitals:   Blood Pressure: 118/59 (03/05/20 1957)  Pulse: 56 (03/05/20 1957)  Temperature: 98 7 °F (37 1 °C) (03/05/20 1957)  Temp Source: Oral (03/05/20 1249)  Respirations: 19 (03/05/20 1957)  Height: 5' 9" (175 3 cm) (03/05/20 1957)  Weight - Scale: 58 9 kg (129 lb 13 6 oz) (03/05/20 1957)  SpO2: 94 % (03/05/20 1957)    Physical Exam   Constitutional: He is oriented to person, place, and time  He appears well-developed and well-nourished  No distress  HENT:   Head: Normocephalic and atraumatic  Eyes: Pupils are equal, round, and reactive to light  EOM are normal    Neck: Normal range of motion  Neck supple     Cardiovascular: Normal rate, regular rhythm, normal heart sounds and intact distal pulses  Exam reveals no gallop and no friction rub  No murmur heard  Pulmonary/Chest: Effort normal  No respiratory distress  He has wheezes (Occasional wheeze)  He has no rales  Abdominal: Soft  Bowel sounds are normal  There is no tenderness  There is no rebound and no guarding  Musculoskeletal: Normal range of motion  He exhibits no edema or tenderness  Neurological: He is alert and oriented to person, place, and time  Skin: Skin is warm and dry  Psychiatric: He has a normal mood and affect  His behavior is normal  Thought content normal      Additional Data:     Lab Results: I have personally reviewed pertinent reports  Results from last 7 days   Lab Units 03/05/20  1359   WBC Thousand/uL 4 37   HEMOGLOBIN g/dL 13 8   HEMATOCRIT % 41 0   PLATELETS Thousands/uL 120*   NEUTROS PCT % 47   LYMPHS PCT % 37   MONOS PCT % 9   EOS PCT % 4     Results from last 7 days   Lab Units 03/05/20  1359   SODIUM mmol/L 146*   POTASSIUM mmol/L 4 1   CHLORIDE mmol/L 107   CO2 mmol/L 28   BUN mg/dL 12   CREATININE mg/dL 0 70   ANION GAP mmol/L 11   CALCIUM mg/dL 8 1*   ALBUMIN g/dL 3 5   TOTAL BILIRUBIN mg/dL 0 80   ALK PHOS U/L 59   ALT U/L 88*   AST U/L 130*   GLUCOSE RANDOM mg/dL 86                       Imaging: Results pending    CTA ED chest PE Study    (Results Pending)   VAS carotid complete study    (Results Pending)       EKG, Pathology, and Other Studies Reviewed on Admission:   · EKG: SR w/ poor R wave progression  No acute ischemic changes    Allscripts / Epic Records Reviewed: Yes     ** Please Note: This note has been constructed using a voice recognition system   **

## 2020-03-06 NOTE — ASSESSMENT & PLAN NOTE
· Reports presumed spider bite to Rt hand on Sunday evening  States hand became swollen on Monday but has since resolved  2 scabbed over puncture wounds noted to Rt hand consistent w/ spider bite  No s/s of infection currently  States current symptoms of light-headedness and blurred vision began the day after insect bite was first noted  · No intervention needed at this time

## 2020-03-06 NOTE — PROGRESS NOTES
Progress Note - Dave Caro 1965, 48 y o  male MRN: [id number]    Unit/Bed#: -01 Encounter: [id number]    Primary Care Provider: No primary care provider on file  Date and time admitted to hospital: 3/5/2020  1:18 PM        Alcohol abuse  Assessment & Plan  · Reports consumes approximately 1/2 of a 5th of Vodka daily  (+) hx of withdrawals  Intention tremors noted on exam  States last intake was at 0500 this am   · Received Ativan 1mg PO in ED  · Librium 25mg q6hrs scheduled  · CIWA protocol initiated, but patient has not required CIWA medication yet  · Folic acid, Thiamine and MVI daily    Insect bite of right hand  Assessment & Plan  Doubt it was an insect bite  Clinically, appears like a minor dermatitis related rash which is currently resolved  No cellulitis  Light-headedness  Assessment & Plan  · Improved  Patient did well with physical therapy  · Do not suspect vertigo, however, most likely this is related to dehydration due to heavy alcohol use  · Encouraged patient to quit drinking  · Also, need to rule out cardiac etiology  ·     * Syncope  Assessment & Plan  · Reports 3 episodes on pre-syncope yesterday (3/4/20) was seen at [facility] and discharged home  This morning, around 0800, he began feeling weak and was holding onto the wall after which S O witnessed him fall to his hands and knees  S O states he briefly lost consciousness and "eyes rolled back " Reports has been having light-headedness, dizziness and blurred vision intermittently since Monday  · CT head at [facility] (-)  · Troponin negative x3  ·  12 lead EKG reveals SR w/ poor R wave progression with no acute ischemic changes    · CTA chest:  Revealed mild COPD  · Carotid doppler was normal  · Check orthostatic BP and HR  · Continue with Isolyte at 100mL/hr  · Due to abnormal EKG and patient reporting recurrent exertional chest pain, recommend cardiology evaluation with possible workup such as Holter monitor versus 2D echo         VTE Pharmacologic Prophylaxis:   Pharmacologic:  Not indicated at this time  Mechanical VTE Prophylaxis in Place: Yes    Patient Centered Rounds: I have performed bedside rounds with nursing staff today  Discussions with Specialists or Other Care Team Provider:  Cardiology  Education and Discussions with Family / Patient:  Patient and his significant other  Time Spent for Care: 20 minutes  More than 50% of total time spent on counseling and coordination of care as described above  Current Length of Stay: 0 day(s)    Current Patient Status: Observation   Certification Statement: The patient will continue to require additional inpatient hospital stay due to Need for GI studies    Discharge Plan:  48 hours    Code Status: Level 1 - Full Code      Subjective:   Patient was seen and examined  He denies dizziness, chest pain or shortness of breath at this time  He is very tremulous  Objective:     Vitals:   Temp (24hrs), Av 7 °F (37 1 °C), Min:98 3 °F (36 8 °C), Max:99 2 °F (37 3 °C)    Temp:  [98 3 °F (36 8 °C)-99 2 °F (37 3 °C)] 98 6 °F (37 °C)  HR:  [56-87] 58  Resp:  [15-19] 16  BP: (111-134)/(59-78) 134/78  SpO2:  [94 %-98 %] 98 %  Body mass index is 19 18 kg/m²  Input and Output Summary (last 24 hours): Intake/Output Summary (Last 24 hours) at 3/6/2020 1657  Last data filed at 3/6/2020 1300  Gross per 24 hour   Intake 840 ml   Output 475 ml   Net 365 ml       Physical Exam:     Physical Exam   Constitutional:   Frail and disheveled   HENT:   Head: Normocephalic and atraumatic  Cardiovascular: Normal rate, regular rhythm and normal heart sounds  Pulmonary/Chest: Effort normal and breath sounds normal  No respiratory distress  Abdominal: Soft  Bowel sounds are normal  There is no tenderness  Musculoskeletal: He exhibits no edema or deformity  Neurological: He is alert  Tremor   Skin: Skin is warm  No rash noted  No erythema     Psychiatric: He has a normal mood and affect  Additional Data:     Labs:    Results from last 7 days   Lab Units 03/06/20  0450   WBC Thousand/uL 4 42   HEMOGLOBIN g/dL 13 4   HEMATOCRIT % 38 7   PLATELETS Thousands/uL 109*   NEUTROS PCT % 55   LYMPHS PCT % 24   MONOS PCT % 14*   EOS PCT % 4     Results from last 7 days   Lab Units 03/06/20  0450   SODIUM mmol/L 139   POTASSIUM mmol/L 3 9   CHLORIDE mmol/L 102   CO2 mmol/L 29   BUN mg/dL 15   CREATININE mg/dL 0 84   ANION GAP mmol/L 8   CALCIUM mg/dL 8 5   ALBUMIN g/dL 3 0*   TOTAL BILIRUBIN mg/dL 1 60*   ALK PHOS U/L 63   ALT U/L 73   AST U/L 92*   GLUCOSE RANDOM mg/dL 95                           * I Have Reviewed All Lab Data Listed Above  * Additional Pertinent Lab Tests Reviewed: All Labs Within Last 24 Hours Reviewed    Imaging:    CTA chest  Carotid duplex  Recent Cultures (last 7 days):           Last 24 Hours Medication List:     Current Facility-Administered Medications:  bisacodyl 10 mg Rectal Daily PRN Ebonie Granda PA-C    calcium carbonate 1,000 mg Oral Daily PRN Ebonie Granda PA-C    chlordiazePOXIDE 25 mg Oral Q6H [address] Richard Mishra PA-C    folic acid 1 mg Oral Daily Ebonie Granda PA-C    LORazepam 0 5 mg Oral Q8H PRN Romina Mak PA-C    meclizine 25 mg Oral Q8H PRN Ebonie Granda PA-C    multi-electrolyte 100 mL/hr Intravenous Continuous Ebonie Granda [place] Last Rate: 100 mL/hr (03/06/20 6516)   multivitamin-minerals 1 tablet Oral Daily Ebonie Granda PA-C    nicotine 21 mg Transdermal Daily Romina Mak PA-C    ondansetron 4 mg Intravenous Q6H PRN Ebonie Granda PA-C    thiamine 100 mg Oral Daily Ebonie Granda PA-C         Today, Patient Was Seen By: Dolores Garay MD    ** Please Note: Dictation voice to text software may have been used in the creation of this document   **

## 2020-03-07 LAB
ALBUMIN SERPL BCP-MCNC: 3 G/DL (ref 3.5–5)
ALP SERPL-CCNC: 72 U/L (ref 46–116)
ALT SERPL W P-5'-P-CCNC: 148 U/L (ref 12–78)
ANION GAP SERPL CALCULATED.3IONS-SCNC: 6 MMOL/L (ref 4–13)
AST SERPL W P-5'-P-CCNC: 284 U/L (ref 5–45)
BILIRUB SERPL-MCNC: 1.7 MG/DL (ref 0.2–1)
BUN SERPL-MCNC: 13 MG/DL (ref 5–25)
CALCIUM SERPL-MCNC: 8.4 MG/DL (ref 8.3–10.1)
CHLORIDE SERPL-SCNC: 103 MMOL/L (ref 100–108)
CHOLEST SERPL-MCNC: 199 MG/DL (ref 50–200)
CO2 SERPL-SCNC: 26 MMOL/L (ref 21–32)
CREAT SERPL-MCNC: 0.63 MG/DL (ref 0.6–1.3)
GFR SERPL CREATININE-BSD FRML MDRD: 113 ML/MIN/1.73SQ M
GLUCOSE P FAST SERPL-MCNC: 101 MG/DL (ref 65–99)
GLUCOSE SERPL-MCNC: 101 MG/DL (ref 65–140)
HDLC SERPL-MCNC: 60 MG/DL
LDLC SERPL CALC-MCNC: 122 MG/DL (ref 0–100)
POTASSIUM SERPL-SCNC: 3.9 MMOL/L (ref 3.5–5.3)
PROT SERPL-MCNC: 6.2 G/DL (ref 6.4–8.2)
SODIUM SERPL-SCNC: 135 MMOL/L (ref 136–145)
TRIGL SERPL-MCNC: 83 MG/DL

## 2020-03-07 PROCEDURE — 80061 LIPID PANEL: CPT | Performed by: NURSE PRACTITIONER

## 2020-03-07 PROCEDURE — 99231 SBSQ HOSP IP/OBS SF/LOW 25: CPT | Performed by: INTERNAL MEDICINE

## 2020-03-07 PROCEDURE — 93880 EXTRACRANIAL BILAT STUDY: CPT | Performed by: SURGERY

## 2020-03-07 PROCEDURE — 80053 COMPREHEN METABOLIC PANEL: CPT | Performed by: FAMILY MEDICINE

## 2020-03-07 PROCEDURE — 99254 IP/OBS CNSLTJ NEW/EST MOD 60: CPT | Performed by: INTERNAL MEDICINE

## 2020-03-07 PROCEDURE — 99232 SBSQ HOSP IP/OBS MODERATE 35: CPT | Performed by: FAMILY MEDICINE

## 2020-03-07 PROCEDURE — 94762 N-INVAS EAR/PLS OXIMTRY CONT: CPT

## 2020-03-07 RX ADMIN — HEPARIN SODIUM 5000 UNITS: 5000 INJECTION INTRAVENOUS; SUBCUTANEOUS at 05:41

## 2020-03-07 RX ADMIN — ACETAMINOPHEN 650 MG: 325 TABLET, FILM COATED ORAL at 00:06

## 2020-03-07 RX ADMIN — SODIUM CHLORIDE, SODIUM GLUCONATE, SODIUM ACETATE, POTASSIUM CHLORIDE AND MAGNESIUM CHLORIDE 100 ML/HR: 526; 502; 368; 37; 30 INJECTION, SOLUTION INTRAVENOUS at 17:26

## 2020-03-07 RX ADMIN — LORAZEPAM 0.5 MG: 0.5 TABLET ORAL at 21:04

## 2020-03-07 RX ADMIN — NICOTINE 21 MG: 21 PATCH TRANSDERMAL at 21:04

## 2020-03-07 RX ADMIN — CHLORDIAZEPOXIDE HYDROCHLORIDE 25 MG: 25 CAPSULE ORAL at 00:06

## 2020-03-07 RX ADMIN — LORAZEPAM 0.5 MG: 0.5 TABLET ORAL at 05:41

## 2020-03-07 RX ADMIN — CHLORDIAZEPOXIDE HYDROCHLORIDE 25 MG: 25 CAPSULE ORAL at 18:46

## 2020-03-07 RX ADMIN — THIAMINE HCL TAB 100 MG 100 MG: 100 TAB at 09:42

## 2020-03-07 RX ADMIN — HEPARIN SODIUM 5000 UNITS: 5000 INJECTION INTRAVENOUS; SUBCUTANEOUS at 21:07

## 2020-03-07 RX ADMIN — SODIUM CHLORIDE, SODIUM GLUCONATE, SODIUM ACETATE, POTASSIUM CHLORIDE AND MAGNESIUM CHLORIDE 100 ML/HR: 526; 502; 368; 37; 30 INJECTION, SOLUTION INTRAVENOUS at 05:01

## 2020-03-07 RX ADMIN — CHLORDIAZEPOXIDE HYDROCHLORIDE 25 MG: 25 CAPSULE ORAL at 23:32

## 2020-03-07 RX ADMIN — CHLORDIAZEPOXIDE HYDROCHLORIDE 25 MG: 25 CAPSULE ORAL at 11:46

## 2020-03-07 RX ADMIN — Medication 1 TABLET: at 09:42

## 2020-03-07 RX ADMIN — CHLORDIAZEPOXIDE HYDROCHLORIDE 25 MG: 25 CAPSULE ORAL at 05:41

## 2020-03-07 RX ADMIN — FOLIC ACID 1 MG: 1 TABLET ORAL at 09:42

## 2020-03-07 NOTE — ASSESSMENT & PLAN NOTE
· Reports consumes approximately 1/2 of a 5th of Vodka daily   Received Ativan 1mg PO in ED  · So far, patient has not been actively withdrawing and his tremors are actually better  · Librium 25mg q6hrs scheduled  · CIWA protocol initiated, but patient has not required CIWA medication yet  · Folic acid, Thiamine and MVI daily

## 2020-03-07 NOTE — ASSESSMENT & PLAN NOTE
· Reports 3 episodes on pre-syncope yesterday (3/4/20) was seen at [facility] and discharged home  This morning, around 0800, he began feeling weak and was holding onto the wall after which S O witnessed him fall to his hands and knees  S O states he briefly lost consciousness and "eyes rolled back " Reports has been having light-headedness, dizziness and blurred vision intermittently since Monday  · CT head at [facility] (-)  · Troponin negative x3  ·  12 lead EKG reveals SR w/ poor R wave progression with no acute ischemic changes  · CTA chest:  Revealed mild COPD  · Carotid doppler was normal  · Check orthostatic BP and HR  · Continue with Isolyte at 100mL/hr  · Due to abnormal EKG and patient reporting recurrent exertional chest pain, initiated cardiology evaluation and they are planning 2D echo

## 2020-03-07 NOTE — CONSULTS
Consultation - 126 [facility]  Dave Caro 48 y o  male MRN: [id number]  Unit/Bed#: -01 Encounter: [id number]        Consults    Reason for Consult / Principal Problem:     transaminitis      ASSESSMENT AND PLAN:        Differential could be alcoholic hepatitis given his AST to ALT ratio of 2-1 verses choledocholithiasis  Will obtain right upper quadrant ultrasound to further evaluate  Continue to trend LFTs  Will need comprehensive liver workup if ultrasound is negative   ______________________________________________________________________    HPI:  48year old male with history of alcohol abuse who presents with syncopal episode  He has pH 2 days prior when he was feeling weak  He presented with similar symptoms  He had a CT scan had which was within normal limits  He had blood work this morning which revealed elevated transaminases and elevated bilirubin  He denies any travel or sick contacts  Currently patient is not having abdominal pain  He does not describe any right upper quadrant tenderness and has not had any discomfort after eating  He is resting comfortably in bed at this time  REVIEW OF SYSTEMS:    CONSTITUTIONAL: Denies any fever, chills, rigors, and weight loss  HEENT: No earache or tinnitus  Denies hearing loss or visual disturbances  CARDIOVASCULAR: No chest pain or palpitations  RESPIRATORY: Denies any cough, hemoptysis, shortness of breath or dyspnea on exertion  GASTROINTESTINAL: As noted in the History of Present Illness  GENITOURINARY: No problems with urination  Denies any hematuria or dysuria  NEUROLOGIC:  Positive for syncopal episode   MUSCULOSKELETAL: Denies any muscle or joint pain  SKIN: Denies skin rashes or itching  ENDOCRINE: Denies excessive thirst  Denies intolerance to heat or cold  PSYCHOSOCIAL: Denies depression or anxiety  Denies any recent memory loss  Historical Information   History reviewed   No pertinent past medical history  History reviewed  No pertinent surgical history  Social History   Social History     Substance and Sexual Activity   Alcohol Use Yes    Frequency: 4 or more times a week    Drinks per session: 5 or 6    Comment: 1/2 5th of vodka daily     Social History     Substance and Sexual Activity   Drug Use Not Currently     Social History     Tobacco Use   Smoking Status Current Every Day Smoker    Packs/day: 2 00    Years: 40 00    Pack years: 80 00    Types: Cigarettes   Smokeless Tobacco Never Used     Family History   Problem Relation Age of Onset    Hypertension Mother     Cancer Mother     Hypertension Father     Aortic aneurysm Father     Heart failure Father        Meds/Allergies     No medications prior to admission  Current Facility-Administered Medications   Medication Dose Route Frequency    acetaminophen (TYLENOL) tablet 650 mg  650 mg Oral Q6H PRN    bisacodyl (DULCOLAX) rectal suppository 10 mg  10 mg Rectal Daily PRN    calcium carbonate (TUMS) chewable tablet 1,000 mg  1,000 mg Oral Daily PRN    chlordiazePOXIDE (LIBRIUM) capsule 25 mg  25 mg Oral T1M [address]    folic acid (FOLVITE) tablet 1 mg  1 mg Oral Daily    heparin (porcine) subcutaneous injection 5,000 Units  5,000 Units Subcutaneous Q8H [address]    LORazepam (ATIVAN) tablet 0 5 mg  0 5 mg Oral Q8H PRN    meclizine (ANTIVERT) tablet 25 mg  25 mg Oral Q8H PRN    multi-electrolyte (PLASMALYTE-A/ISOLYTE-S PH 7 4) IV solution  100 mL/hr Intravenous Continuous    multivitamin-minerals (CENTRUM) tablet 1 tablet  1 tablet Oral Daily    nicotine (NICODERM CQ) 21 mg/24 hr TD 24 hr patch 21 mg  21 mg Transdermal Daily    ondansetron (ZOFRAN) injection 4 mg  4 mg Intravenous Q6H PRN    thiamine (VITAMIN B1) tablet 100 mg  100 mg Oral Daily       No Known Allergies        Objective     Blood pressure 132/80, pulse 79, temperature 97 8 °F (36 6 °C), temperature source Oral, resp   rate 18, height 5' 9" (1 753 m), weight 58 9 kg (129 lb 13 6 oz), SpO2 99 %  Body mass index is 19 18 kg/m²  Intake/Output Summary (Last 24 hours) at 3/7/2020 1240  Last data filed at 3/7/2020 1101  Gross per 24 hour   Intake 3938 33 ml   Output 1325 ml   Net 2613 33 ml         PHYSICAL EXAM:      General Appearance:   Alert, cooperative, no distress   HEENT:   Normocephalic, atraumatic, anicteric      Neck:  Supple, symmetrical, trachea midline   Lungs:   Clear to auscultation bilaterally; no rales, rhonchi or wheezing; respirations unlabored    Heart[de-identified]   Regular rate and rhythm; no murmur, rub, or gallop     Abdomen:   Soft, non-tender, non-distended; normal bowel sounds; no masses, no organomegaly    Genitalia:   Deferred    Rectal:   Deferred    Extremities:  No cyanosis, clubbing or edema    Pulses:  2+ and symmetric all extremities    Skin:  No jaundice, rashes, or lesions    Lymph nodes:  No palpable cervical lymphadenopathy        Lab Results:   Admission on 03/05/2020   Component Date Value    WBC 03/05/2020 4 37     RBC 03/05/2020 3 91     Hemoglobin 03/05/2020 13 8     Hematocrit 03/05/2020 41 0     MCV 03/05/2020 105*    MCH 03/05/2020 35 3*    MCHC 03/05/2020 33 7     RDW 03/05/2020 15 0     MPV 03/05/2020 10 1     Platelets 79/73/3466 120*    nRBC 03/05/2020 0     Neutrophils Relative 03/05/2020 47     Immat GRANS % 03/05/2020 0     Lymphocytes Relative 03/05/2020 37     Monocytes Relative 03/05/2020 9     Eosinophils Relative 03/05/2020 4     Basophils Relative 03/05/2020 3*    Neutrophils Absolute 03/05/2020 2 10     Immature Grans Absolute 03/05/2020 0 01     Lymphocytes Absolute 03/05/2020 1 61     Monocytes Absolute 03/05/2020 0 37     Eosinophils Absolute 03/05/2020 0 16     Basophils Absolute 03/05/2020 0 12*    Sodium 03/05/2020 146*    Potassium 03/05/2020 4 1     Chloride 03/05/2020 107     CO2 03/05/2020 28     ANION GAP 03/05/2020 11     BUN 03/05/2020 12     Creatinine 03/05/2020 0 70     Glucose 03/05/2020 86     Calcium 03/05/2020 8 1*    AST 03/05/2020 130*    ALT 03/05/2020 88*    Alkaline Phosphatase 03/05/2020 59     Total Protein 03/05/2020 6 8     Albumin 03/05/2020 3 5     Total Bilirubin 03/05/2020 0 80     eGFR 03/05/2020 109     Troponin I 03/05/2020 <0 02     NT-proBNP 03/05/2020 59     TSH 3RD GENERATON 03/05/2020 0 751     D-Dimer, Quant 03/05/2020 1 26*    Troponin I 03/05/2020 <0 02     Ventricular Rate 03/05/2020 61     Atrial Rate 03/05/2020 61     OH Interval 03/05/2020 156     QRSD Interval 03/05/2020 104     QT Interval 03/05/2020 458     QTC Interval 03/05/2020 461     P Axis 03/05/2020 63     QRS Axis 03/05/2020 88     T Wave Axis 03/05/2020 30     Troponin I 03/05/2020 0 02     Sodium 03/06/2020 139     Potassium 03/06/2020 3 9     Chloride 03/06/2020 102     CO2 03/06/2020 29     ANION GAP 03/06/2020 8     BUN 03/06/2020 15     Creatinine 03/06/2020 0 84     Glucose 03/06/2020 95     Calcium 03/06/2020 8 5     AST 03/06/2020 92*    ALT 03/06/2020 73     Alkaline Phosphatase 03/06/2020 63     Total Protein 03/06/2020 6 2*    Albumin 03/06/2020 3 0*    Total Bilirubin 03/06/2020 1 60*    eGFR 03/06/2020 101     Magnesium 03/06/2020 1 6     WBC 03/06/2020 4 42     RBC 03/06/2020 3 76*    Hemoglobin 03/06/2020 13 4     Hematocrit 03/06/2020 38 7     MCV 03/06/2020 103*    MCH 03/06/2020 35 6*    MCHC 03/06/2020 34 6     RDW 03/06/2020 14 3     MPV 03/06/2020 10 5     Platelets 31/67/5075 109*    nRBC 03/06/2020 0     Neutrophils Relative 03/06/2020 55     Immat GRANS % 03/06/2020 1     Lymphocytes Relative 03/06/2020 24     Monocytes Relative 03/06/2020 14*    Eosinophils Relative 03/06/2020 4     Basophils Relative 03/06/2020 2*    Neutrophils Absolute 03/06/2020 2 48     Immature Grans Absolute 03/06/2020 0 02     Lymphocytes Absolute 03/06/2020 1 05     Monocytes Absolute 03/06/2020 0 60     Eosinophils Absolute 03/06/2020 0 18     Basophils Absolute 03/06/2020 0 09     Bilirubin, Direct 03/06/2020 0 32*    Sodium 03/07/2020 135*    Potassium 03/07/2020 3 9     Chloride 03/07/2020 103     CO2 03/07/2020 26     ANION GAP 03/07/2020 6     BUN 03/07/2020 13     Creatinine 03/07/2020 0 63     Glucose 03/07/2020 101     Glucose, Fasting 03/07/2020 101*    Calcium 03/07/2020 8 4     AST 03/07/2020 284*    ALT 03/07/2020 148*    Alkaline Phosphatase 03/07/2020 72     Total Protein 03/07/2020 6 2*    Albumin 03/07/2020 3 0*    Total Bilirubin 03/07/2020 1 70*    eGFR 03/07/2020 113     Cholesterol 03/07/2020 199     Triglycerides 03/07/2020 83     HDL, Direct 03/07/2020 60     LDL Calculated 03/07/2020 122*       Imaging Studies: I have personally reviewed pertinent imaging studies

## 2020-03-07 NOTE — ASSESSMENT & PLAN NOTE
Noted significant worsening of  transaminitis today  AST is 284 and ALT is 148  GI has been consulted and recommend ultrasound of the right upper quadrant tomorrow

## 2020-03-07 NOTE — PROGRESS NOTES
Cardiology Progress Note - Roosevelt Whitney 48 y o  male MRN: [id number]    Unit/Bed#: -01 Encounter: [id number]      Assessment:  1  Recurrent Syncope  2  Chest pain   3  Alcohol abuse with withdrawal  4  Tobacco abuse    Plan:  · His syncopal episode was likely secondary to volume depletion from limited hydration and alcohol abuse  · Troponins negative x 3; ECG with poor anterior r wave progression  · Echocardiogram pending  · He was bradycardic but with no other events on telemetry  · Will consider inpatient vs outpatient pharmacologic MPI  · Consider evaluation for esophagitis/varices as a source of his chest pain    Subjective:   Patient seen and examined  He is resting comfortably  He denies any concerns at this time  Objective:     Vitals: Blood pressure 138/79, pulse 98, temperature 97 6 °F (36 4 °C), temperature source Oral, resp  rate 18, height 5' 9" (1 753 m), weight 58 9 kg (129 lb 13 6 oz), SpO2 99 %  , Body mass index is 19 18 kg/m² ,   Orthostatic Blood Pressures      Most Recent Value   Blood Pressure  138/79 filed at 03/07/2020 0700   Patient Position - Orthostatic VS  Lying filed at 03/07/2020 0700            Intake/Output Summary (Last 24 hours) at 3/7/2020 1059  Last data filed at 3/7/2020 0501  Gross per 24 hour   Intake 3938 33 ml   Output 625 ml   Net 3313 33 ml         Physical Exam:    GEN: Roosevelt Whitney appears well, alert and oriented x 3, pleasant and cooperative   HEENT: pupils equal, round, and reactive to light; extraocular muscles intact  NECK: supple, no carotid bruits   HEART: regular rhythm, normal S1 and S2, no murmurs, clicks, gallops or rubs   LUNGS: clear to auscultation bilaterally; no wheezes, rales, or rhonchi   ABDOMEN: normal bowel sounds, soft, no tenderness, no distention  EXTREMITIES: peripheral pulses normal; no clubbing, cyanosis, or edema      Medications:      Current Facility-Administered Medications:     acetaminophen (TYLENOL) tablet 650 mg, 650 mg, Oral, Q6H PRN, Celeste House MD, 650 mg at 03/07/20 0006    bisacodyl (DULCOLAX) rectal suppository 10 mg, 10 mg, Rectal, Daily PRN, Earline Kaminski PA-C    calcium carbonate (TUMS) chewable tablet 1,000 mg, 1,000 mg, Oral, Daily PRN, Earline Gordy, PA-C    chlordiazePOXIDE (LIBRIUM) capsule 25 mg, 25 mg, Oral, Q6H [address], Earline Gordy, PA-C, 25 mg at 11/58/54 5059    folic acid (FOLVITE) tablet 1 mg, 1 mg, Oral, Daily, Earline Kaminski, PA-C, 1 mg at 03/06/20 0834    heparin (porcine) subcutaneous injection 5,000 Units, 5,000 Units, Subcutaneous, Q8H [address], Martha Cote MD, 5,000 Units at 03/07/20 0541    LORazepam (ATIVAN) tablet 0 5 mg, 0 5 mg, Oral, Q8H PRN, Montana Nguyen PA-C, 0 5 mg at 03/07/20 0541    meclizine (ANTIVERT) tablet 25 mg, 25 mg, Oral, Q8H PRN, Earline Kaminski, PA-C    multi-electrolyte (PLASMALYTE-A/ISOLYTE-S PH 7 4) IV solution, 100 mL/hr, Intravenous, Continuous, Richard Mishra PA-C, Last Rate: 100 mL/hr at 03/07/20 0501, 100 mL/hr at 03/07/20 0501    multivitamin-minerals (CENTRUM) tablet 1 tablet, 1 tablet, Oral, Daily, Earline Kaminski PA-C, 1 tablet at 03/06/20 0834    nicotine (NICODERM CQ) 21 mg/24 hr TD 24 hr patch 21 mg, 21 mg, Transdermal, Daily, JACKY Schmidt-XI, 21 mg at 03/06/20 2133    ondansetron (ZOFRAN) injection 4 mg, 4 mg, Intravenous, Q6H PRN, Earline Kaminski, PA-C    thiamine (VITAMIN B1) tablet 100 mg, 100 mg, Oral, Daily, Earline Kaminski PA-C, 100 mg at 03/06/20 2284     Labs & Results:    Results from last 7 days   Lab Units 03/05/20  2124 03/05/20  1640 03/05/20  1359   TROPONIN I ng/mL 0 02 <0 02 <0 02     Results from last 7 days   Lab Units 03/06/20  0450 03/05/20  1359   WBC Thousand/uL 4 42 4 37   HEMOGLOBIN g/dL 13 4 13 8   HEMATOCRIT % 38 7 41 0   PLATELETS Thousands/uL 109* 120*     Results from last 7 days   Lab Units 03/07/20  0506   TRIGLYCERIDES mg/dL 83   HDL mg/dL 60     Results from last 7 days   Lab Units 03/07/20  0506 03/06/20  0450 03/05/20  1359   POTASSIUM mmol/L 3 9 3 9 4 1   CHLORIDE mmol/L 103 102 107   CO2 mmol/L 26 29 28   BUN mg/dL 13 15 12   CREATININE mg/dL 0 63 0 84 0 70   CALCIUM mg/dL 8 4 8 5 8 1*   ALK PHOS U/L 72 63 59   ALT U/L 148* 73 88*   AST U/L 284* 92* 130*         Results from last 7 days   Lab Units 03/06/20  0450   MAGNESIUM mg/dL 1 6

## 2020-03-07 NOTE — UTILIZATION REVIEW
Initial Clinical Review - patient admitted as Observation on 3/5/20, changed to Inpatient status on 3/7/20 for evaluation of worsening transaminitis, ongoing cardiac evaluation for syncope  Admission: Date/Time/Statement:   Admission Orders (From admission, onward)     Ordered        03/07/20 0903  Inpatient Admission  Once                   Orders Placed This Encounter   Procedures    Inpatient Admission     Standing Status:   Standing     Number of Occurrences:   1     Order Specific Question:   Admitting Physician     Answer:   Cindy Wilburn [[id number]]     Order Specific Question:   Level of Care     Answer:   Med Surg [[id number]]     Order Specific Question:   Estimated length of stay     Answer:   More than 2 Midnights     Order Specific Question:   Certification     Answer:   I certify that inpatient services are medically necessary for this patient for a duration of greater than two midnights  See H&P and MD Progress Notes for additional information about the patient's course of treatment  ED Arrival Information     Expected Arrival Acuity Means of Arrival Escorted By Service Admission Type    - 3/5/2020 12:46 Urgent Walk-In Spouse General Medicine Urgent    Arrival Complaint    Syncope,blurred vision         Chief Complaint   Patient presents with    Syncope     Pt presents following syncopal episode after waking up this morning, he was at [facility] yesterday and was told he had a low platelet count  Pt reports feeling weak since Monday after a supposed spider bite  Assessment/Plan: 48year old male to the ED from home with complaints of syncopal episode, dizziness, lightheaded  Admitted under observation for syncope  6 days prior, he awoke with a presumed insect bite and swelling of right hand  A day prior to his arrival to ED, was seen at another ED, had extensive blood work and CT of head which were negative  Dizziness has been getting progressively worse   ON the day of his arrival, he stood up, was walking, became so lightheaded, he lowered himself to the ground and blacked out  Has had blurry vision for 6 days  Visual acuity 20/40 in the R eye, 20/30 in the L eye   GCS=15  Strength strong and symmetric  Noted to have 2 tiny puncture marks to right hand  Not swollen or red  INitial troponin -, continue to trend  CT head - at other hospital    Check carotid doppler  Check orthstatic vs   IV fluids initiated  CTA chest   H/o alcohol abuse with h/o withdrawals  Has tremors on exam   Given Ativan in ED  CIWA protocol  Mild transaminitis  3/6 Cardiology consult:His syncopal episode was following a hot shower in the setting of baseline volume depletion from limited hydration and alcohol abuse  He denies cardiac prodrome  Check echocardiogram,  monitor on telemetry  Given withdrawal symptoms, he will likely not be able to tolerate and exercise stress test  Consider evaluation for esophagitis/varices as a source of his chest pain  Continue IV hydration  3/7 Cardiology note: ECHO pending  Bradycardia, no other event on telemetry  Internal Medicine note: No active withdrawal, tremors improving  Significant worsening of transaminitis today, AST is 284 and ALT is 148, Gastroenterology consulted, recommended RUQ US   The patient will continue to require additional inpatient hospital stay due to Transaminitis and cardiac workup         ED Triage Vitals   Temperature Pulse Respirations Blood Pressure SpO2   03/05/20 1249 03/05/20 1249 03/05/20 1249 03/05/20 1249 03/05/20 1249   97 5 °F (36 4 °C) 72 16 128/75 98 %      Temp Source Heart Rate Source Patient Position - Orthostatic VS BP Location FiO2 (%)   03/05/20 1249 03/05/20 1249 03/05/20 1249 03/05/20 1249 --   Oral Monitor Sitting Left arm       Pain Score       03/05/20 2103       No Pain          Wt Readings from Last 1 Encounters:   03/05/20 58 9 kg (129 lb 13 6 oz)     Additional Vital Signs:     3/7/10 CIWA Score 2-3    Date/Time  Temp  Pulse Resp  BP   SpO2  O2 Device  Patient Position - Orthostatic VS   03/07/20 1146           99 %  None (Room air)     03/07/20 1100  97 8 °  79  18  132/80   99 %    Sitting   03/07/20 0700  97 6   98  18  138/79   98 %    Lying   03/07/20 0300  98 4 °  56  18  118/70   98 %  None (Room air)  Lying       Date/Time  Temp  Pulse  Resp  BP  MAP (mmHg)  SpO2  O2 Device  Patient Position - Orthostatic VS   03/06/20 0748              None (Room air)     03/06/20 07:24:56  98 5 °F (36 9 °C)  64  18  131/72  92  96 %       03/06/20 03:04:56  98 3 °F (36 8 °C)  73  15  128/65  86  94 %  None (Room air)  Lying   03/06/20 0300            95 %  None (Room air)     03/05/20 2300  99 2 °F (37 3 °C)  87  15  123/59  80  95 %  None (Room air)  Lying   03/05/20 2125    71  16      95 %       03/05/20 19:57:51  98 7 °F (37 1 °C)  56  19  118/59  79  94 %  None (Room air)     03/05/20 1830    64    111/63           03/05/20 1407    57  16  115/67           03/05/20 1402    52Abnormal     115/67           CIWA 1-5      Pertinent Labs/Diagnostic Test Results:   3/6 Carotid doppler:  CONCLUSION:  RIGHT:  There is no evidence of disease throughout the extracranial carotid arteries  Vertebral artery flow is antegrade  There is no significant subclavian artery disease  LEFT:  There is no evidence of disease throughout the extracranial carotid arteries  Vertebral artery flow is antegrade  There is no significant subclavian artery disease  3/5 EKG:  Normal sinus rhythm  Poor R wave progression  Possible Anterior infarct , age undetermined  Abnormal ECG  No previous ECGs available  3/5 CTA CHest:  No PE  Mild COPD with biapical pleural parenchymal scarring  No acute pulmonary process         Ref Range & Units 3/6/20 0450 3/5/20 1359   WBC 4 31 - 10 16 Thousand/uL 4 42  4 37    RBC 3 88 - 5 62 Million/uL 3 76Low   3 91    Hemoglobin 12 0 - 17 0 g/dL 13 4  13 8    Hematocrit 36 5 - 49 3 % 38 7  41 0 MCV 82 - 98 fL 103High   105High     MCH 26 8 - 34 3 pg 35  6High   35  3High     MCHC 31 4 - 37 4 g/dL 34 6  33 7    RDW 11 6 - 15 1 % 14 3  15 0    MPV 8 9 - 12 7 fL 10 5  10 1    Platelets 132 - 482 Thousands/uL 109Low   120Low           Results from last 7 days   Lab Units 03/07/20  0506 03/06/20  0450 03/05/20  1359   SODIUM mmol/L 135* 139 146*   POTASSIUM mmol/L 3 9 3 9 4 1   CHLORIDE mmol/L 103 102 107   CO2 mmol/L 26 29 28   ANION GAP mmol/L 6 8 11   BUN mg/dL 13 15 12   CREATININE mg/dL 0 63 0 84 0 70   EGFR ml/min/1 73sq m 113 101 109   CALCIUM mg/dL 8 4 8 5 8 1*   MAGNESIUM mg/dL  --  1 6  --      Results from last 7 days   Lab Units 03/07/20  0506 03/06/20  1411 03/06/20  0450 03/05/20  1359   AST U/L 284*  --  92* 130*   ALT U/L 148*  --  73 88*   ALK PHOS U/L 72  --  63 59   TOTAL PROTEIN g/dL 6 2*  --  6 2* 6 8   ALBUMIN g/dL 3 0*  --  3 0* 3 5   TOTAL BILIRUBIN mg/dL 1 70*  --  1 60* 0 80   BILIRUBIN DIRECT mg/dL  --  0 32*  --   --          Results from last 7 days   Lab Units 03/07/20  0506 03/06/20  0450 03/05/20  1359   GLUCOSE RANDOM mg/dL 101 95 86         Results from last 7 days   Lab Units 03/05/20  2124 03/05/20  1640 03/05/20  1359   TROPONIN I ng/mL 0 02 <0 02 <0 02     Results from last 7 days   Lab Units 03/05/20  1445   D-DIMER QUANTITATIVE ug/ml FEU 1 26*     Results from last 7 days   Lab Units 03/05/20  1359   TSH 3RD GENERATON uIU/mL 0 751     Results from last 7 days   Lab Units 03/05/20  1359   NT-PRO BNP pg/mL 59     ED Treatment:   Medication Administration from 03/05/2020 1246 to 03/05/2020 1952       Date/Time Order Dose Route Action     03/05/2020 1353 LORazepam (ATIVAN) tablet 1 mg 1 mg Oral Given        Admitting Diagnosis: Syncope [R55]  Dyspnea [R06 00]  Age/Sex: 48 y o  male  Admission Orders:  Neuro checks every 4 hours  Tele  Bilirubin direct  Scheduled Medications:    Medications:  chlordiazePOXIDE 25 mg Oral V4B [address]   folic acid 1 mg Oral Daily   heparin (porcine) 5,000 Units Subcutaneous Q8H [address]   multivitamin-minerals 1 tablet Oral Daily   nicotine 21 mg Transdermal Daily   thiamine 100 mg Oral Daily     Continuous IV Infusions:    multi-electrolyte 100 mL/hr Intravenous Continuous     PRN Meds:    acetaminophen 650 mg Oral Q6H PRN   bisacodyl 10 mg Rectal Daily PRN   calcium carbonate 1,000 mg Oral Daily PRN   LORazepam 0 5 mg Oral Q8H PRN   meclizine 25 mg Oral Q8H PRN   ondansetron 4 mg Intravenous Q6H PRN             Network Utilization Review Department  [email or web address]  ATTENTION: Please call with any questions or concerns to [phone] and carefully listen to the prompts so that you are directed to the right person  All voicemails are confidential   Renetta Holland all requests for admission clinical reviews, approved or denied determinations and any other requests to dedicated fax number below belonging to the campus where the patient is receiving treatment   List of dedicated fax numbers for the Facilities:  [address] [place] [address] DENIALS (Administrative/Medical Necessity) [phone]   [address] [place] [address] (Maternity/NICU/Pediatrics) [phone]   [facility] [phone]   Rj Morelos [phone]   [facility] [phone]   Lyle Mack [phone]   [facility] [address] [phone]   [facility]  [phone]   [address] [facility] [address]  [address] [facility] [address] [facility] [phone]

## 2020-03-07 NOTE — PROGRESS NOTES
Progress Note - Christiana Agee 1965, 48 y o  male MRN: [id number]    Unit/Bed#: -01 Encounter: [id number]    Primary Care Provider: No primary care provider on file  Date and time admitted to hospital: 3/5/2020  1:18 PM        Alcohol abuse  Assessment & Plan  · Reports consumes approximately 1/2 of a 5th of Vodka daily  Received Ativan 1mg PO in ED  · So far, patient has not been actively withdrawing and his tremors are actually better  · Librium 25mg q6hrs scheduled  · CIWA protocol initiated, but patient has not required CIWA medication yet  · Folic acid, Thiamine and MVI daily    Transaminitis, mild  Assessment & Plan  Noted significant worsening of  transaminitis today  AST is 284 and ALT is 148  GI has been consulted and recommend ultrasound of the right upper quadrant tomorrow    Insect bite of right hand  Assessment & Plan  Doubt it was an insect bite  Clinically, appears like a minor dermatitis related rash which is currently resolved  No cellulitis  Light-headedness  Assessment & Plan  · Improved  Patient did well with physical therapy  · Do not suspect vertigo, however, most likely this is related to dehydration due to heavy alcohol use  · Encouraged patient to quit drinking  · Also, need to rule out cardiac etiology  ·     * Syncope  Assessment & Plan  · Reports 3 episodes on pre-syncope yesterday (3/4/20) was seen at [facility] and discharged home  This morning, around 0800, he began feeling weak and was holding onto the wall after which S O witnessed him fall to his hands and knees  S O states he briefly lost consciousness and "eyes rolled back " Reports has been having light-headedness, dizziness and blurred vision intermittently since Monday  · CT head at [facility] (-)  · Troponin negative x3  ·  12 lead EKG reveals SR w/ poor R wave progression with no acute ischemic changes    · CTA chest:  Revealed mild COPD  · Carotid doppler was normal  · Check orthostatic BP and HR  · Continue with Isolyte at 100mL/hr  · Due to abnormal EKG and patient reporting recurrent exertional chest pain, initiated cardiology evaluation and they are planning 2D echo  VTE Pharmacologic Prophylaxis:   Pharmacologic: Heparin  Mechanical VTE Prophylaxis in Place: Yes    Patient Centered Rounds: I have performed bedside rounds with nursing staff today  Discussions with Specialists or Other Care Team Provider:  GI    Education and Discussions with Family / Patient:  Patient    Time Spent for Care: 20 minutes  More than 50% of total time spent on counseling and coordination of care as described above  Current Length of Stay: 0 day(s)    Current Patient Status: Inpatient   Certification Statement: The patient will continue to require additional inpatient hospital stay due to Transaminitis and cardiac workup    Discharge Plan:  24 hours    Code Status: Level 1 - Full Code      Subjective:   Patient was seen and examined  He states that he is doing better today because his tremors are improved  Objective:     Vitals:   Temp (24hrs), Av 1 °F (36 7 °C), Min:97 6 °F (36 4 °C), Max:98 6 °F (37 °C)    Temp:  [97 6 °F (36 4 °C)-98 6 °F (37 °C)] 97 8 °F (36 6 °C)  HR:  [56-98] 79  Resp:  [16-19] 18  BP: (118-146)/(64-91) 132/80  SpO2:  [98 %-99 %] 99 %  Body mass index is 19 18 kg/m²  Input and Output Summary (last 24 hours): Intake/Output Summary (Last 24 hours) at 3/7/2020 1402  Last data filed at 3/7/2020 1146  Gross per 24 hour   Intake 3818 33 ml   Output 1325 ml   Net 2493 33 ml       Physical Exam:     Physical Exam   HENT:   Head: Normocephalic and atraumatic  Eyes: Pupils are equal, round, and reactive to light  Neck: Normal range of motion  Cardiovascular: Normal rate, regular rhythm and normal heart sounds  Pulmonary/Chest: Effort normal and breath sounds normal  No respiratory distress  Abdominal: Soft  There is no tenderness  Musculoskeletal: He exhibits no edema or deformity  Neurological: He is alert  Less tremulous   Skin: Skin is warm  No rash noted  No erythema  Psychiatric: He has a normal mood and affect  Additional Data:     Labs:    Results from last 7 days   Lab Units 03/06/20  0450   WBC Thousand/uL 4 42   HEMOGLOBIN g/dL 13 4   HEMATOCRIT % 38 7   PLATELETS Thousands/uL 109*   NEUTROS PCT % 55   LYMPHS PCT % 24   MONOS PCT % 14*   EOS PCT % 4     Results from last 7 days   Lab Units 03/07/20  0506   SODIUM mmol/L 135*   POTASSIUM mmol/L 3 9   CHLORIDE mmol/L 103   CO2 mmol/L 26   BUN mg/dL 13   CREATININE mg/dL 0 63   ANION GAP mmol/L 6   CALCIUM mg/dL 8 4   ALBUMIN g/dL 3 0*   TOTAL BILIRUBIN mg/dL 1 70*   ALK PHOS U/L 72   ALT U/L 148*   AST U/L 284*   GLUCOSE RANDOM mg/dL 101                           * I Have Reviewed All Lab Data Listed Above  * Additional Pertinent Lab Tests Reviewed:  All Labs Within Last 24 Hours Reviewed    Imaging:      Recent Cultures (last 7 days):           Last 24 Hours Medication List:     Current Facility-Administered Medications:  acetaminophen 650 mg Oral Q6H PRN Karin Valencia MD    bisacodyl 10 mg Rectal Daily PRN JACKY Malloy-XI    calcium carbonate 1,000 mg Oral Daily PRN JACKY Malloy-XI    chlordiazePOXIDE 25 mg Oral Q6H [address] Richard Mishra PA-C    folic acid 1 mg Oral Daily Marah Hogan, PA-XI    heparin (porcine) 5,000 Units Subcutaneous [facility] Jesús Calderon MD    LORazepam 0 5 mg Oral Q8H PRN Ishaan Magallanes PA-C    meclizine 25 mg Oral Q8H PRN Marah Hogan, PA-C    multi-electrolyte 100 mL/hr Intravenous Continuous Thom Malloy Last Rate: 100 mL/hr (03/07/20 0501)   multivitamin-minerals 1 tablet Oral Daily Marah Hogan, PA-XI    nicotine 21 mg Transdermal Daily Ishaan Belt, PA-XI    ondansetron 4 mg Intravenous Q6H PRN Marah Hogan, PA-C    thiamine 100 mg Oral Daily JACKY Malloy-C         Today, Patient Was Seen By: Jesús Calderon MD    ** Please Note: Dictation voice to text software may have been used in the creation of this document   **

## 2020-03-08 ENCOUNTER — APPOINTMENT (INPATIENT)
Dept: NON INVASIVE DIAGNOSTICS | Facility: HOSPITAL | Age: 55
DRG: 315 | End: 2020-03-08
Payer: COMMERCIAL

## 2020-03-08 ENCOUNTER — APPOINTMENT (INPATIENT)
Dept: ULTRASOUND IMAGING | Facility: HOSPITAL | Age: 55
DRG: 315 | End: 2020-03-08
Payer: COMMERCIAL

## 2020-03-08 LAB
ALBUMIN SERPL BCP-MCNC: 3.4 G/DL (ref 3.5–5)
ALP SERPL-CCNC: 68 U/L (ref 46–116)
ALT SERPL W P-5'-P-CCNC: 258 U/L (ref 12–78)
ANION GAP SERPL CALCULATED.3IONS-SCNC: 9 MMOL/L (ref 4–13)
APTT PPP: 29 SECONDS (ref 23–37)
AST SERPL W P-5'-P-CCNC: 407 U/L (ref 5–45)
BILIRUB SERPL-MCNC: 1.5 MG/DL (ref 0.2–1)
BUN SERPL-MCNC: 14 MG/DL (ref 5–25)
CALCIUM SERPL-MCNC: 9.1 MG/DL (ref 8.3–10.1)
CHLORIDE SERPL-SCNC: 101 MMOL/L (ref 100–108)
CO2 SERPL-SCNC: 27 MMOL/L (ref 21–32)
CREAT SERPL-MCNC: 0.7 MG/DL (ref 0.6–1.3)
GFR SERPL CREATININE-BSD FRML MDRD: 109 ML/MIN/1.73SQ M
GLUCOSE SERPL-MCNC: 98 MG/DL (ref 65–140)
INR PPP: 0.92 (ref 0.84–1.19)
POTASSIUM SERPL-SCNC: 3.8 MMOL/L (ref 3.5–5.3)
PROT SERPL-MCNC: 6.8 G/DL (ref 6.4–8.2)
PROTHROMBIN TIME: 12.4 SECONDS (ref 11.6–14.5)
SODIUM SERPL-SCNC: 137 MMOL/L (ref 136–145)

## 2020-03-08 PROCEDURE — 76705 ECHO EXAM OF ABDOMEN: CPT

## 2020-03-08 PROCEDURE — 85610 PROTHROMBIN TIME: CPT | Performed by: FAMILY MEDICINE

## 2020-03-08 PROCEDURE — 80074 ACUTE HEPATITIS PANEL: CPT | Performed by: FAMILY MEDICINE

## 2020-03-08 PROCEDURE — 80053 COMPREHEN METABOLIC PANEL: CPT | Performed by: FAMILY MEDICINE

## 2020-03-08 PROCEDURE — 99232 SBSQ HOSP IP/OBS MODERATE 35: CPT | Performed by: INTERNAL MEDICINE

## 2020-03-08 PROCEDURE — 85730 THROMBOPLASTIN TIME PARTIAL: CPT | Performed by: FAMILY MEDICINE

## 2020-03-08 PROCEDURE — 99232 SBSQ HOSP IP/OBS MODERATE 35: CPT | Performed by: FAMILY MEDICINE

## 2020-03-08 PROCEDURE — 93306 TTE W/DOPPLER COMPLETE: CPT | Performed by: INTERNAL MEDICINE

## 2020-03-08 PROCEDURE — 94762 N-INVAS EAR/PLS OXIMTRY CONT: CPT

## 2020-03-08 PROCEDURE — 99231 SBSQ HOSP IP/OBS SF/LOW 25: CPT | Performed by: INTERNAL MEDICINE

## 2020-03-08 PROCEDURE — 93306 TTE W/DOPPLER COMPLETE: CPT

## 2020-03-08 RX ORDER — CHLORDIAZEPOXIDE HYDROCHLORIDE 25 MG/1
25 CAPSULE, GELATIN COATED ORAL EVERY 4 HOURS
Status: DISCONTINUED | OUTPATIENT
Start: 2020-03-08 | End: 2020-03-09

## 2020-03-08 RX ADMIN — HEPARIN SODIUM 5000 UNITS: 5000 INJECTION INTRAVENOUS; SUBCUTANEOUS at 05:27

## 2020-03-08 RX ADMIN — CHLORDIAZEPOXIDE HYDROCHLORIDE 25 MG: 25 CAPSULE ORAL at 16:45

## 2020-03-08 RX ADMIN — CHLORDIAZEPOXIDE HYDROCHLORIDE 25 MG: 25 CAPSULE ORAL at 19:59

## 2020-03-08 RX ADMIN — HEPARIN SODIUM 5000 UNITS: 5000 INJECTION INTRAVENOUS; SUBCUTANEOUS at 14:34

## 2020-03-08 RX ADMIN — Medication 1 TABLET: at 09:05

## 2020-03-08 RX ADMIN — CHLORDIAZEPOXIDE HYDROCHLORIDE 25 MG: 25 CAPSULE ORAL at 05:27

## 2020-03-08 RX ADMIN — FOLIC ACID 1 MG: 1 TABLET ORAL at 09:05

## 2020-03-08 RX ADMIN — SODIUM CHLORIDE, SODIUM GLUCONATE, SODIUM ACETATE, POTASSIUM CHLORIDE AND MAGNESIUM CHLORIDE 100 ML/HR: 526; 502; 368; 37; 30 INJECTION, SOLUTION INTRAVENOUS at 10:05

## 2020-03-08 RX ADMIN — HEPARIN SODIUM 5000 UNITS: 5000 INJECTION INTRAVENOUS; SUBCUTANEOUS at 21:49

## 2020-03-08 RX ADMIN — LORAZEPAM 0.5 MG: 0.5 TABLET ORAL at 10:07

## 2020-03-08 RX ADMIN — THIAMINE HCL TAB 100 MG 100 MG: 100 TAB at 09:05

## 2020-03-08 RX ADMIN — NICOTINE 21 MG: 21 PATCH TRANSDERMAL at 21:49

## 2020-03-08 RX ADMIN — SODIUM CHLORIDE, SODIUM GLUCONATE, SODIUM ACETATE, POTASSIUM CHLORIDE AND MAGNESIUM CHLORIDE 100 ML/HR: 526; 502; 368; 37; 30 INJECTION, SOLUTION INTRAVENOUS at 20:03

## 2020-03-08 RX ADMIN — CHLORDIAZEPOXIDE HYDROCHLORIDE 25 MG: 25 CAPSULE ORAL at 11:54

## 2020-03-08 NOTE — ASSESSMENT & PLAN NOTE
· Reports consumes approximately 1/2 of a 5th of Vodka daily  Received Ativan 1mg PO in ED  · So far, patient has not been actively withdrawing and his tremors are actually better, but he feels excessive anxiety this morning  · Due to worsening anxiety and alcohol withdrawal, will increase Librium to 25mg q4hrs scheduled  · CIWA protocol initiated, discussed with the nurse to use p r n  Benzodiazepines cautiously because the standing dose has been increased    · Continue Folic acid, Thiamine and MVI daily

## 2020-03-08 NOTE — PROGRESS NOTES
Progress Note - Robin Hernandez 1965, 48 y o  male MRN: [id number]    Unit/Bed#: -01 Encounter: [id number]    Primary Care Provider: No primary care provider on file  Date and time admitted to hospital: 3/5/2020  1:18 PM        Alcohol abuse  Assessment & Plan  · Reports consumes approximately 1/2 of a 5th of Vodka daily  Received Ativan 1mg PO in ED  · So far, patient has not been actively withdrawing and his tremors are actually better, but he feels excessive anxiety this morning  · Due to worsening anxiety and alcohol withdrawal, will increase Librium to 25mg q4hrs scheduled  · CIWA protocol initiated, discussed with the nurse to use p r n  Benzodiazepines cautiously because the standing dose has been increased  · Continue Folic acid, Thiamine and MVI daily    Transaminitis, mild  Assessment & Plan  Again noted significant worsening of  transaminitis today  AST is 284-->407 and ALT is 148-->258  GI has been consulted and recommend ultrasound of the right upper quadrant which was done and pending  Awaiting for further GI recommendations  Insect bite of right hand  Assessment & Plan  Doubt it was an insect bite  Clinically, appears like a minor dermatitis related rash which is currently resolved  No cellulitis  Light-headedness  Assessment & Plan  · Improved  Patient did well with physical therapy  · Do not suspect vertigo, however, most likely this is related to dehydration due to heavy alcohol use  · Encouraged patient to quit drinking  Less likely cardiac etiology  No events on the monitor  2D echo pending  * Syncope  Assessment & Plan  · Reports 3 episodes on pre-syncope yesterday (3/4/20) was seen at [facility] and discharged home  This morning, around 0800, he began feeling weak and was holding onto the wall after which S O witnessed him fall to his hands and knees   S O states he briefly lost consciousness and "eyes rolled back " Reports has been having light-headedness, dizziness and blurred vision intermittently since Monday  · CT head at [facility] (-)  · Troponin negative x3  ·  12 lead EKG reveals SR w/ poor R wave progression with no acute ischemic changes  · CTA chest:  Revealed mild COPD  · Carotid doppler was normal  · Check orthostatic BP and HR  · Continue with Isolyte at 100mL/hr  · Cardiology follows  · 2D echo pending  · Patient may benefit from outpatient stress test         VTE Pharmacologic Prophylaxis:   Pharmacologic: Heparin  Mechanical VTE Prophylaxis in Place: Yes    Patient Centered Rounds: I have performed bedside rounds with nursing staff today  Discussions with Specialists or Other Care Team Provider:  Nursing    Education and Discussions with Family / Patient:  Patient    Time Spent for Care: 20 minutes  More than 50% of total time spent on counseling and coordination of care as described above  Current Length of Stay: 1 day(s)    Current Patient Status: Inpatient   Certification Statement: The patient will continue to require additional inpatient hospital stay due to Worsening transaminitis    Discharge Plan:  24-48 hours    Code Status: Level 1 - Full Code      Subjective:   Patient was seen and examined today  He is stating that he feels "horrible"  He reports significant level of anxiety and claustrophobia  He is asking to "come out of the hospital and stay outside for some time"  Objective:     Vitals:   Temp (24hrs), Av 8 °F (36 6 °C), Min:97 5 °F (36 4 °C), Max:98 °F (36 7 °C)    Temp:  [97 5 °F (36 4 °C)-98 °F (36 7 °C)] 98 °F (36 7 °C)  HR:  [53-65] 65  Resp:  [17-18] 18  BP: (107-133)/(64-78) 120/71  SpO2:  [97 %-100 %] 100 %  Body mass index is 19 18 kg/m²  Input and Output Summary (last 24 hours):        Intake/Output Summary (Last 24 hours) at 3/8/2020 1342  Last data filed at 3/8/2020 1101  Gross per 24 hour   Intake 240 ml   Output 2600 ml   Net -2360 ml       Physical Exam:     Physical Exam   Constitutional: He appears well-developed and well-nourished  Cardiovascular: Normal rate, regular rhythm and normal heart sounds  Pulmonary/Chest: Effort normal and breath sounds normal  No respiratory distress  Abdominal: Soft  There is no tenderness  Musculoskeletal: He exhibits no edema or deformity  Neurological: He is alert  Skin: Skin is warm  No rash noted  No erythema  Psychiatric: His mood appears anxious  Additional Data:     Labs:    Results from last 7 days   Lab Units 03/06/20  0450   WBC Thousand/uL 4 42   HEMOGLOBIN g/dL 13 4   HEMATOCRIT % 38 7   PLATELETS Thousands/uL 109*   NEUTROS PCT % 55   LYMPHS PCT % 24   MONOS PCT % 14*   EOS PCT % 4     Results from last 7 days   Lab Units 03/08/20  0446   SODIUM mmol/L 137   POTASSIUM mmol/L 3 8   CHLORIDE mmol/L 101   CO2 mmol/L 27   BUN mg/dL 14   CREATININE mg/dL 0 70   ANION GAP mmol/L 9   CALCIUM mg/dL 9 1   ALBUMIN g/dL 3 4*   TOTAL BILIRUBIN mg/dL 1 50*   ALK PHOS U/L 68   ALT U/L 258*   AST U/L 407*   GLUCOSE RANDOM mg/dL 98                           * I Have Reviewed All Lab Data Listed Above  * Additional Pertinent Lab Tests Reviewed:  All Labs Within Last 24 Hours Reviewed    Imaging:      Recent Cultures (last 7 days):           Last 24 Hours Medication List:     Current Facility-Administered Medications:  acetaminophen 650 mg Oral Q6H PRN Zara Negrete MD    bisacodyl 10 mg Rectal Daily PRN Roosevelt Perkins PA-C    calcium carbonate 1,000 mg Oral Daily PRN Roosevelt Perkins PA-C    chlordiazePOXIDE 25 mg Oral Q4H Kosta Li MD    folic acid 1 mg Oral Daily Roosevelt Perkins PA-C    heparin (porcine) 5,000 Units Subcutaneous [facility] Kosta Li MD    LORazepam 0 5 mg Oral Q8H PRN Thanh Perkins PA-C    meclizine 25 mg Oral Q8H PRN Roosevelt Perkins PA-C    multi-electrolyte 100 mL/hr Intravenous Continuous Thom Tavarez Last Rate: 100 mL/hr (03/08/20 1005)   multivitamin-minerals 1 tablet Oral Daily Roosevelt Perkins PA-C nicotine 21 mg Transdermal Daily Katie Morales PA-C    ondansetron 4 mg Intravenous Q6H PRN Guillermina Schroeder PA-C    thiamine 100 mg Oral Daily Guillermina Schroeder PA-C         Today, Patient Was Seen By: Nayan Dowell MD    ** Please Note: Dictation voice to text software may have been used in the creation of this document   **

## 2020-03-08 NOTE — ASSESSMENT & PLAN NOTE
· Improved  Patient did well with physical therapy  · Do not suspect vertigo, however, most likely this is related to dehydration due to heavy alcohol use  · Encouraged patient to quit drinking  Less likely cardiac etiology  No events on the monitor  2D echo pending

## 2020-03-08 NOTE — ASSESSMENT & PLAN NOTE
· Reports 3 episodes on pre-syncope yesterday (3/4/20) was seen at [facility] and discharged home  This morning, around 0800, he began feeling weak and was holding onto the wall after which S O witnessed him fall to his hands and knees  S O states he briefly lost consciousness and "eyes rolled back " Reports has been having light-headedness, dizziness and blurred vision intermittently since Monday  · CT head at [facility] (-)  · Troponin negative x3  ·  12 lead EKG reveals SR w/ poor R wave progression with no acute ischemic changes  · CTA chest:  Revealed mild COPD  · Carotid doppler was normal  · Check orthostatic BP and HR  · Continue with Isolyte at 100mL/hr  · Cardiology follows  · 2D echo pending    · Patient may benefit from outpatient stress test

## 2020-03-08 NOTE — PROGRESS NOTES
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%    Plan:  · His syncopal episode was likely secondary to volume depletion from limited hydration and alcohol abuse  · Troponins negative x 3; ECG with poor anterior r wave progression  · Echocardiogram revealed mildly reduced LV function with possible mild septal/apical hypokinesis  Will arrange for a pharmacologic MPI  · He was bradycardic but with no other events on telemetry      Subjective:   Patient seen and examined  No significant events overnight  He notes that his chest pain has improved  He denies any cardiac concerns at this time  Objective:     Vitals: Blood pressure 120/71, pulse 65, temperature 98 °F (36 7 °C), resp  rate 18, height 5' 9" (1 753 m), weight 58 9 kg (129 lb 13 6 oz), SpO2 100 %  , Body mass index is 19 18 kg/m² ,   Orthostatic Blood Pressures      Most Recent Value   Blood Pressure  120/71 filed at 03/08/2020 1131   Patient Position - Orthostatic VS  Lying filed at 03/07/2020 2349            Intake/Output Summary (Last 24 hours) at 3/8/2020 1154  Last data filed at 3/8/2020 0327  Gross per 24 hour   Intake    Output 2600 ml   Net -2600 ml         Physical Exam:    GEN: Serge Vela appears well, alert and oriented x 3, pleasant and cooperative   HEENT: pupils equal, round, and reactive to light; extraocular muscles intact  NECK: supple, no carotid bruits   HEART: regular rhythm, normal S1 and S2, no murmurs, clicks, gallops or rubs   LUNGS: clear to auscultation bilaterally; no wheezes, rales, or rhonchi   ABDOMEN: normal bowel sounds, soft, no tenderness, no distention  EXTREMITIES: peripheral pulses normal; no clubbing, cyanosis, or edema      Medications:      Current Facility-Administered Medications:     acetaminophen (TYLENOL) tablet 650 mg, 650 mg, Oral, Q6H PRN, Olaf German MD, 650 mg at 03/07/20 0006    bisacodyl (DULCOLAX) rectal suppository 10 mg, 10 mg, Rectal, Daily PRN, Carey Cottrell PA-C    calcium carbonate (TUMS) chewable tablet 1,000 mg, 1,000 mg, Oral, Daily PRN, Carey Cottrell PA-C    chlordiazePOXIDE (LIBRIUM) capsule 25 mg, 25 mg, Oral, Q4H, Krystian Madrigal MD    folic acid (FOLVITE) tablet 1 mg, 1 mg, Oral, Daily, Richard Mishra PA-C, 1 mg at 03/08/20 3057    heparin (porcine) subcutaneous injection 5,000 Units, 5,000 Units, Subcutaneous, Q8H [facility], Krystian Madrigal MD, 5,000 Units at 03/08/20 9750    LORazepam (ATIVAN) tablet 0 5 mg, 0 5 mg, Oral, Q8H PRN, Ute Hill PA-C, 0 5 mg at 03/08/20 1007    meclizine (ANTIVERT) tablet 25 mg, 25 mg, Oral, Q8H PRN, Carey Cottrell PA-C    multi-electrolyte (PLASMALYTE-A/ISOLYTE-S PH 7 4) IV solution, 100 mL/hr, Intravenous, Continuous, Richard Mishra PA-C, Last Rate: 100 mL/hr at 03/08/20 1005, 100 mL/hr at 03/08/20 1005    multivitamin-minerals (CENTRUM) tablet 1 tablet, 1 tablet, Oral, Daily, Carey Cottrell PA-C, 1 tablet at 03/08/20 0905    nicotine (NICODERM CQ) 21 mg/24 hr TD 24 hr patch 21 mg, 21 mg, Transdermal, Daily, Ute Hill PA-C, 21 mg at 03/07/20 2104    ondansetron (ZOFRAN) injection 4 mg, 4 mg, Intravenous, Q6H PRN, Carey Cottrell PA-C    thiamine (VITAMIN B1) tablet 100 mg, 100 mg, Oral, Daily, Carey Cottrell PA-C, 100 mg at 03/08/20 0037     Labs & Results:    Results from last 7 days   Lab Units 03/05/20  2124 03/05/20  1640 03/05/20  1359   TROPONIN I ng/mL 0 02 <0 02 <0 02     Results from last 7 days   Lab Units 03/06/20  0450 03/05/20  1359   WBC Thousand/uL 4 42 4 37   HEMOGLOBIN g/dL 13 4 13 8   HEMATOCRIT % 38 7 41 0   PLATELETS Thousands/uL 109* 120*     Results from last 7 days   Lab Units 03/07/20  0506   TRIGLYCERIDES mg/dL 83   HDL mg/dL 60     Results from last 7 days   Lab Units 03/08/20  0446 03/07/20  0506 03/06/20  0450   POTASSIUM mmol/L 3 8 3 9 3 9   CHLORIDE mmol/L 101 103 102   CO2 mmol/L 27 26 29   BUN mg/dL 14 13 15   CREATININE mg/dL 0 70 0 63 0 84   CALCIUM mg/dL 9 1 8 4 8 5   ALK PHOS U/L 68 72 63   ALT U/L 258* 148* 73   AST U/L 407* 284* 92*         Results from last 7 days   Lab Units 03/06/20  0450   MAGNESIUM mg/dL 1 6

## 2020-03-08 NOTE — ASSESSMENT & PLAN NOTE
Again noted significant worsening of  transaminitis today  AST is 284-->407 and ALT is 148-->258  GI has been consulted and recommend ultrasound of the right upper quadrant which was done and pending  Awaiting for further GI recommendations

## 2020-03-09 ENCOUNTER — APPOINTMENT (INPATIENT)
Dept: NON INVASIVE DIAGNOSTICS | Facility: HOSPITAL | Age: 55
DRG: 315 | End: 2020-03-09
Payer: COMMERCIAL

## 2020-03-09 ENCOUNTER — APPOINTMENT (INPATIENT)
Dept: NUCLEAR MEDICINE | Facility: HOSPITAL | Age: 55
DRG: 315 | End: 2020-03-09
Payer: COMMERCIAL

## 2020-03-09 VITALS
HEIGHT: 69 IN | WEIGHT: 129.85 LBS | RESPIRATION RATE: 17 BRPM | HEART RATE: 68 BPM | BODY MASS INDEX: 19.23 KG/M2 | SYSTOLIC BLOOD PRESSURE: 117 MMHG | DIASTOLIC BLOOD PRESSURE: 65 MMHG | TEMPERATURE: 98.1 F | OXYGEN SATURATION: 98 %

## 2020-03-09 PROBLEM — I42.9 CARDIOMYOPATHY (HCC): Status: ACTIVE | Noted: 2020-03-09

## 2020-03-09 LAB
ALBUMIN SERPL BCP-MCNC: 3.1 G/DL (ref 3.5–5)
ALP SERPL-CCNC: 63 U/L (ref 46–116)
ALT SERPL W P-5'-P-CCNC: 191 U/L (ref 12–78)
ANION GAP SERPL CALCULATED.3IONS-SCNC: 7 MMOL/L (ref 4–13)
ARRHY DURING EX: NORMAL
AST SERPL W P-5'-P-CCNC: 174 U/L (ref 5–45)
BASOPHILS # BLD AUTO: 0.07 THOUSANDS/ΜL (ref 0–0.1)
BASOPHILS NFR BLD AUTO: 1 % (ref 0–1)
BILIRUB SERPL-MCNC: 0.9 MG/DL (ref 0.2–1)
BUN SERPL-MCNC: 14 MG/DL (ref 5–25)
CALCIUM SERPL-MCNC: 8.9 MG/DL (ref 8.3–10.1)
CHEST PAIN STATEMENT: NORMAL
CHLORIDE SERPL-SCNC: 104 MMOL/L (ref 100–108)
CO2 SERPL-SCNC: 26 MMOL/L (ref 21–32)
CREAT SERPL-MCNC: 0.72 MG/DL (ref 0.6–1.3)
EOSINOPHIL # BLD AUTO: 0.24 THOUSAND/ΜL (ref 0–0.61)
EOSINOPHIL NFR BLD AUTO: 5 % (ref 0–6)
ERYTHROCYTE [DISTWIDTH] IN BLOOD BY AUTOMATED COUNT: 14 % (ref 11.6–15.1)
GFR SERPL CREATININE-BSD FRML MDRD: 107 ML/MIN/1.73SQ M
GLUCOSE SERPL-MCNC: 101 MG/DL (ref 65–140)
HAV IGM SER QL: NORMAL
HBV CORE IGM SER QL: NORMAL
HBV SURFACE AG SER QL: NORMAL
HCT VFR BLD AUTO: 39.2 % (ref 36.5–49.3)
HCV AB SER QL: NORMAL
HGB BLD-MCNC: 13.4 G/DL (ref 12–17)
IMM GRANULOCYTES # BLD AUTO: 0.03 THOUSAND/UL (ref 0–0.2)
IMM GRANULOCYTES NFR BLD AUTO: 1 % (ref 0–2)
LYMPHOCYTES # BLD AUTO: 1.28 THOUSANDS/ΜL (ref 0.6–4.47)
LYMPHOCYTES NFR BLD AUTO: 24 % (ref 14–44)
MAX DIASTOLIC BP: 58 MMHG
MAX HEART RATE: 90 BPM
MAX PREDICTED HEART RATE: 168 BPM
MAX. SYSTOLIC BP: 118 MMHG
MCH RBC QN AUTO: 36.2 PG (ref 26.8–34.3)
MCHC RBC AUTO-ENTMCNC: 34.2 G/DL (ref 31.4–37.4)
MCV RBC AUTO: 106 FL (ref 82–98)
MONOCYTES # BLD AUTO: 0.59 THOUSAND/ΜL (ref 0.17–1.22)
MONOCYTES NFR BLD AUTO: 11 % (ref 4–12)
NEUTROPHILS # BLD AUTO: 3.03 THOUSANDS/ΜL (ref 1.85–7.62)
NEUTS SEG NFR BLD AUTO: 58 % (ref 43–75)
NRBC BLD AUTO-RTO: 0 /100 WBCS
PLATELET # BLD AUTO: 107 THOUSANDS/UL (ref 149–390)
PMV BLD AUTO: 11.3 FL (ref 8.9–12.7)
POTASSIUM SERPL-SCNC: 3.9 MMOL/L (ref 3.5–5.3)
PROT SERPL-MCNC: 6.4 G/DL (ref 6.4–8.2)
PROTOCOL NAME: NORMAL
RBC # BLD AUTO: 3.7 MILLION/UL (ref 3.88–5.62)
REASON FOR TERMINATION: NORMAL
SODIUM SERPL-SCNC: 137 MMOL/L (ref 136–145)
TARGET HR FORMULA: NORMAL
TEST INDICATION: NORMAL
TIME IN EXERCISE PHASE: NORMAL
WBC # BLD AUTO: 5.24 THOUSAND/UL (ref 4.31–10.16)

## 2020-03-09 PROCEDURE — 97110 THERAPEUTIC EXERCISES: CPT

## 2020-03-09 PROCEDURE — 94762 N-INVAS EAR/PLS OXIMTRY CONT: CPT

## 2020-03-09 PROCEDURE — 93017 CV STRESS TEST TRACING ONLY: CPT

## 2020-03-09 PROCEDURE — 99232 SBSQ HOSP IP/OBS MODERATE 35: CPT | Performed by: INTERNAL MEDICINE

## 2020-03-09 PROCEDURE — 78452 HT MUSCLE IMAGE SPECT MULT: CPT

## 2020-03-09 PROCEDURE — 97165 OT EVAL LOW COMPLEX 30 MIN: CPT

## 2020-03-09 PROCEDURE — 80053 COMPREHEN METABOLIC PANEL: CPT | Performed by: FAMILY MEDICINE

## 2020-03-09 PROCEDURE — 99239 HOSP IP/OBS DSCHRG MGMT >30: CPT | Performed by: FAMILY MEDICINE

## 2020-03-09 PROCEDURE — 93016 CV STRESS TEST SUPVJ ONLY: CPT | Performed by: INTERNAL MEDICINE

## 2020-03-09 PROCEDURE — A9502 TC99M TETROFOSMIN: HCPCS

## 2020-03-09 PROCEDURE — 85025 COMPLETE CBC W/AUTO DIFF WBC: CPT | Performed by: FAMILY MEDICINE

## 2020-03-09 PROCEDURE — 93018 CV STRESS TEST I&R ONLY: CPT | Performed by: INTERNAL MEDICINE

## 2020-03-09 PROCEDURE — 78452 HT MUSCLE IMAGE SPECT MULT: CPT | Performed by: INTERNAL MEDICINE

## 2020-03-09 PROCEDURE — 97116 GAIT TRAINING THERAPY: CPT

## 2020-03-09 RX ORDER — FOLIC ACID 1 MG/1
1 TABLET ORAL DAILY
Qty: 30 TABLET | Refills: 0 | Status: SHIPPED | OUTPATIENT
Start: 2020-03-10 | End: 2020-12-04 | Stop reason: ALTCHOICE

## 2020-03-09 RX ORDER — CHLORDIAZEPOXIDE HYDROCHLORIDE 25 MG/1
25 CAPSULE, GELATIN COATED ORAL EVERY 6 HOURS PRN
Refills: 0 | Status: CANCELLED | OUTPATIENT
Start: 2020-03-09 | End: 2020-03-19

## 2020-03-09 RX ORDER — CHLORDIAZEPOXIDE HYDROCHLORIDE 25 MG/1
25 CAPSULE, GELATIN COATED ORAL 3 TIMES DAILY PRN
Qty: 10 CAPSULE | Refills: 0 | Status: SHIPPED | OUTPATIENT
Start: 2020-03-09 | End: 2020-12-04 | Stop reason: ALTCHOICE

## 2020-03-09 RX ORDER — CHLORDIAZEPOXIDE HYDROCHLORIDE 25 MG/1
25 CAPSULE, GELATIN COATED ORAL 3 TIMES DAILY PRN
Qty: 10 CAPSULE | Refills: 0 | Status: CANCELLED | OUTPATIENT
Start: 2020-03-09 | End: 2020-03-19

## 2020-03-09 RX ORDER — LISINOPRIL 2.5 MG/1
2.5 TABLET ORAL DAILY
Qty: 30 TABLET | Refills: 0 | Status: SHIPPED | OUTPATIENT
Start: 2020-03-09 | End: 2020-04-03

## 2020-03-09 RX ORDER — LANOLIN ALCOHOL/MO/W.PET/CERES
100 CREAM (GRAM) TOPICAL DAILY
Qty: 30 TABLET | Refills: 0 | Status: SHIPPED | OUTPATIENT
Start: 2020-03-10 | End: 2020-12-04 | Stop reason: ALTCHOICE

## 2020-03-09 RX ORDER — CHLORDIAZEPOXIDE HYDROCHLORIDE 25 MG/1
25 CAPSULE, GELATIN COATED ORAL EVERY 6 HOURS PRN
Status: DISCONTINUED | OUTPATIENT
Start: 2020-03-09 | End: 2020-03-09 | Stop reason: HOSPADM

## 2020-03-09 RX ADMIN — THIAMINE HCL TAB 100 MG 100 MG: 100 TAB at 08:24

## 2020-03-09 RX ADMIN — SODIUM CHLORIDE, SODIUM GLUCONATE, SODIUM ACETATE, POTASSIUM CHLORIDE AND MAGNESIUM CHLORIDE 100 ML/HR: 526; 502; 368; 37; 30 INJECTION, SOLUTION INTRAVENOUS at 06:02

## 2020-03-09 RX ADMIN — CHLORDIAZEPOXIDE HYDROCHLORIDE 25 MG: 25 CAPSULE ORAL at 08:24

## 2020-03-09 RX ADMIN — FOLIC ACID 1 MG: 1 TABLET ORAL at 08:24

## 2020-03-09 RX ADMIN — REGADENOSON 0.4 MG: 0.08 INJECTION, SOLUTION INTRAVENOUS at 09:37

## 2020-03-09 RX ADMIN — Medication 1 TABLET: at 08:24

## 2020-03-09 RX ADMIN — HEPARIN SODIUM 5000 UNITS: 5000 INJECTION INTRAVENOUS; SUBCUTANEOUS at 05:02

## 2020-03-09 RX ADMIN — CHLORDIAZEPOXIDE HYDROCHLORIDE 25 MG: 25 CAPSULE ORAL at 11:28

## 2020-03-09 NOTE — ASSESSMENT & PLAN NOTE
· Reports consumes approximately 1/2 of a 5th of Vodka daily  Received Ativan 1mg PO in ED  · Continue Folic acid, Thiamine and MVI daily  · Provided with a short taper course of Librium upon discharge

## 2020-03-09 NOTE — PLAN OF CARE
Problem: PHYSICAL THERAPY ADULT  Goal: Performs mobility at highest level of function for planned discharge setting  See evaluation for individualized goals  Description  Treatment/Interventions: Functional transfer training, LE strengthening/ROM, Elevations, Therapeutic exercise, Endurance training, Patient/family training, Equipment eval/education, Bed mobility, Gait training, Spoke to nursing, Family          See flowsheet documentation for full assessment, interventions and recommendations  Outcome: Progressing  Note:   Prognosis: Good  Problem List: Impaired balance, Decreased coordination, Decreased safety awareness, Decreased strength, Decreased endurance  Assessment: Pt tolerated todays tx well  He was able to progress endurance with gait training to 100'x2 with no AD and supervision  Pt completed 6 steps on the stairs with VCs for technique and safety  Pt had little difficulty with exercises performed this session  Pt would benefit from out patient P T  Once d/c to maximize functional mobility  Continue with current POC  Barriers to Discharge: Inaccessible home environment     Recommendation: Outpatient PT     PT - OK to Discharge: Yes    See flowsheet documentation for full assessment

## 2020-03-09 NOTE — PROGRESS NOTES
Cardiology Progress Note - Thiago Stein 48 y o  male MRN: [id number]    Unit/Bed#: -01 Encounter: [id number]      Assessment/Plan:  1- Recurrent syncope, likely secondary from dehydration/EtOH intoxication  TTE reveals mildly reduced LVEF  Telemetry was negative for arrhythmias or pauses  2- Chest pain, troponins negative x3, EKG with poor anterior R-wave progression  3- Mild cardiomyopathy 45-50%  Evaluate with Pharmacologic MPI today to assess for ischemia  4- TOB/Etoh abuse  Counseling given  CIWA protocol per primary team    Subjective:   Patient seen and examined  No significant events overnight  Objective:     Vitals: Blood pressure 115/59, pulse 75, temperature 97 9 °F (36 6 °C), resp  rate 18, height 5' 9" (1 753 m), weight 58 9 kg (129 lb 13 6 oz), SpO2 99 %  , Body mass index is 19 18 kg/m² ,   Orthostatic Blood Pressures      Most Recent Value   Blood Pressure  115/59 filed at 03/09/2020 1223   Patient Position - Orthostatic VS  Lying filed at 03/09/2020 0308            Intake/Output Summary (Last 24 hours) at 3/9/2020 1418  Last data filed at 3/9/2020 4601  Gross per 24 hour   Intake 2378 33 ml   Output 2850 ml   Net -471 67 ml         Physical Exam:    GEN: Thiago Stein appears well, alert and oriented x 3, pleasant and cooperative   HEENT: pupils equal, round, and reactive to light; extraocular muscles intact  NECK: supple, no carotid bruits   HEART: regular rhythm, normal S1 and S2, no murmurs, clicks, gallops or rubs   LUNGS: clear to auscultation bilaterally; no wheezes, rales, or rhonchi   ABDOMEN: normal bowel sounds, soft, no tenderness, no distention  EXTREMITIES: peripheral pulses normal; no clubbing, cyanosis, or edema      Medications:      Current Facility-Administered Medications:     acetaminophen (TYLENOL) tablet 650 mg, 650 mg, Oral, Q6H PRN, Giovanna Dawson MD, 650 mg at 03/07/20 0006    bisacodyl (DULCOLAX) rectal suppository 10 mg, 10 mg, Rectal, Daily PRN, Celestia Clarity, PA-C    calcium carbonate (TUMS) chewable tablet 1,000 mg, 1,000 mg, Oral, Daily PRN, Celestia Clarity, PA-C    chlordiazePOXIDE (LIBRIUM) capsule 25 mg, 25 mg, Oral, Q6H PRN, Memo Marquez MD    folic acid (FOLVITE) tablet 1 mg, 1 mg, Oral, Daily, JACKY Anaya-C, 1 mg at 03/09/20 5050    heparin (porcine) subcutaneous injection 5,000 Units, 5,000 Units, Subcutaneous, Q8H [address], Memo Marquez MD, 5,000 Units at 03/09/20 0502    LORazepam (ATIVAN) tablet 0 5 mg, 0 5 mg, Oral, Q8H PRN, JACKY Tinsley-C, 0 5 mg at 03/08/20 1007    meclizine (ANTIVERT) tablet 25 mg, 25 mg, Oral, Q8H PRN, Celestia Clarity, PA-C    multivitamin-minerals (CENTRUM) tablet 1 tablet, 1 tablet, Oral, Daily, Celestia Clarity, PA-C, 1 tablet at 03/09/20 0824    nicotine (NICODERM CQ) 21 mg/24 hr TD 24 hr patch 21 mg, 21 mg, Transdermal, Daily, JACKY Tinsley-C, 21 mg at 03/08/20 2149    ondansetron [facility]) injection 4 mg, 4 mg, Intravenous, Q6H PRN, Celestia Clarity, PA-C    thiamine (VITAMIN B1) tablet 100 mg, 100 mg, Oral, Daily, Celestia Clarity, PA-C, 100 mg at 03/09/20 8581     Labs & Results:    Results from last 7 days   Lab Units 03/05/20  2124 03/05/20  1640 03/05/20  1359   TROPONIN I ng/mL 0 02 <0 02 <0 02     Results from last 7 days   Lab Units 03/09/20  0501 03/06/20  0450 03/05/20  1359   WBC Thousand/uL 5 24 4 42 4 37   HEMOGLOBIN g/dL 13 4 13 4 13 8   HEMATOCRIT % 39 2 38 7 41 0   PLATELETS Thousands/uL 107* 109* 120*     Results from last 7 days   Lab Units 03/07/20  0506   TRIGLYCERIDES mg/dL 83   HDL mg/dL 60     Results from last 7 days   Lab Units 03/09/20  0501 03/08/20  0446 03/07/20  0506   POTASSIUM mmol/L 3 9 3 8 3 9   CHLORIDE mmol/L 104 101 103   CO2 mmol/L 26 27 26   BUN mg/dL 14 14 13   CREATININE mg/dL 0 72 0 70 0 63   CALCIUM mg/dL 8 9 9 1 8 4   ALK PHOS U/L 63 68 72   ALT U/L 191* 258* 148*   AST U/L 174* 407* 284*     Results from last 7 days   Lab Units 03/08/20  1720 INR  0 92   PTT seconds 29     Results from last 7 days   Lab Units 03/06/20  0450   MAGNESIUM mg/dL 1 6

## 2020-03-09 NOTE — ASSESSMENT & PLAN NOTE
Mildly depressed ejection fraction of 45-50% according to the 2D echo  Cardiology recommend start lisinopril 2 5 mg daily

## 2020-03-09 NOTE — PLAN OF CARE
Problem: OCCUPATIONAL THERAPY ADULT  Goal: Performs self-care activities at highest level of function for planned discharge setting  See evaluation for individualized goals  Description  Treatment Interventions: Compensatory technique education, Continued evaluation, Cardiac education, Energy conservation, Activityengagement, Equipment evaluation/education, Endurance training          See flowsheet documentation for full assessment, interventions and recommendations  Note:   Limitation: Decreased self-care trans, Decreased high-level ADLs  Prognosis: Good  Assessment: Pt is a 48 y o  male seen for OT evaluation s/p admit to [facility] on 3/5/2020 w/ Syncope  Comorbidities affecting pt's functional performance at time of assessment include:syncope, transaminitis (mild), insect bite of right hand, and tobacco abuse   Orders placed for OT evaluation and treatment and "act as jordan" order  Performed at least two patient identifiers during session including name and wristband  Personal factors affecting pt at time of IE include:steps to enter environment, difficulty performing IADLS  and health management   Prior to admission, pt reports Ind with ADLs, Ind with IADLs, and (+) driving  Upon evaluation: Pt requires MI with UB ADLs, MI with LB ADLs, S with xfers and min A with functional mobility 2* the following deficits impacting occupational performance: weakness, decreased activity tolerance, environmental deficits and requiring external assistance to complete transitional movements  Pt to benefit from continued skilled OT tx while in the hospital to address deficits as defined above and maximize level of functional independence w ADL's and functional mobility  Occupational Performance areas to address include: medication management and household maintenance  From OT standpoint, recommendation at time of d/c would be home no needs         OT Discharge Recommendation: Home with family support

## 2020-03-09 NOTE — ASSESSMENT & PLAN NOTE
· Reports 3 episodes on pre-syncope yesterday (3/4/20) was seen at [facility] and discharged home  This morning, around 0800, he began feeling weak and was holding onto the wall after which S O witnessed him fall to his hands and knees  S O states he briefly lost consciousness and "eyes rolled back " Reports has been having light-headedness, dizziness and blurred vision intermittently since Monday  · CT head at [facility] (-)  · Troponin negative x3  ·  12 lead EKG reveals SR w/ poor R wave progression with no acute ischemic changes  · CTA chest:  Revealed mild COPD  · Carotid doppler was normal  · Cardiology follows  · 2D echo reveals slightly decreased ejection fraction at 45-50%  Per Cardiology, 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

## 2020-03-09 NOTE — PROGRESS NOTES
GI Progress Note - Elen Oden 48 y o  male MRN: [id number]    Unit/Bed#: -01 Encounter: [id number]    Subjective: Mr Jane Edmond is feeling great, he wants to go smoke a cigarette  Reports he is typically drinking half of a 5th of liquor daily  Objective:     Vitals: Blood pressure 125/77, pulse 57, temperature (!) 97 3 °F (36 3 °C), resp  rate 18, height 5' 9" (1 753 m), weight 58 9 kg (129 lb 13 6 oz), SpO2 100 %  ,Body mass index is 19 18 kg/m²  Intake/Output Summary (Last 24 hours) at 3/9/2020 1145  Last data filed at 3/9/2020 0603  Gross per 24 hour   Intake 2378 33 ml   Output 2850 ml   Net -471 67 ml       Physical Exam:     General Appearance: Alert, oriented x3, no acute distress  Lungs: Clear to auscultation bilaterally, no rales or rhonchi, no labored breathing/accessory muscle use  Heart: Regular rate and rhythm, S1, S2 normal, no murmur, click, rub or gallop  Abdomen: Soft, non-tender, non-distended; bowel sounds normal; no masses or no organomegaly  Extremities: No cyanosis, edema    Invasive Devices     Peripheral Intravenous Line            Peripheral IV 03/08/20 Dorsal (posterior); Right Forearm 1 day                Lab Results:  Results from last 7 days   Lab Units 03/09/20  0501   WBC Thousand/uL 5 24   HEMOGLOBIN g/dL 13 4   HEMATOCRIT % 39 2   PLATELETS Thousands/uL 107*   NEUTROS PCT % 58   LYMPHS PCT % 24   MONOS PCT % 11   EOS PCT % 5     Results from last 7 days   Lab Units 03/09/20  0501   POTASSIUM mmol/L 3 9   CHLORIDE mmol/L 104   CO2 mmol/L 26   BUN mg/dL 14   CREATININE mg/dL 0 72   CALCIUM mg/dL 8 9   ALK PHOS U/L 63   ALT U/L 191*   AST U/L 174*     Results from last 7 days   Lab Units 03/08/20  1720   INR  0 92           Imaging Studies: I have personally reviewed pertinent imaging studies  Us Right Upper Quadrant  Result Date: 3/8/2020  Impression: 1  Mildly increased echotexture likely representing hepatic steatosis  2   Trace perihepatic fluid      Cta Ed Chest Pe Study  Result Date: 3/5/2020  Impression: 1  No PE  2  Mild COPD with biapical pleural parenchymal scarring  No acute pulmonary process  Assessment and Plan:   Principal Problem:    Syncope  Active Problems:    Light-headedness    Insect bite of right hand    Transaminitis, mild    Alcohol abuse    Elevated LFTs  Alcohol Abuse  - Pt presented with presyncopal symptoms that have been recurrent and an episode of syncope prior to admission and was found to have elevated LFTs incidentally on admission  - No abdominal pain to suggest biliary etiology  - RUQ US shows steatosis without acute biliary findings  - Significant alcohol abuse with drinking half of a 1/5th of liquid daily  - This is likely a mild alcoholic hepatitis (DF <16) evidenced by AST>ALT, thrombocytopenia, elevated MCV +/- component of low grade ischemia in the setting of presyncope/dizziness and 1 episode of syncope  - LFTs overall stable and improving  - Acute hepatitis panel negative for viral hepatitis  - Recommend rechecking LFTs in 1 week with PCP and then again in 2-3 months to ensure they have normalized  - Alcohol cessation      The patient will be seen by Dr Ana Luisa Khanna  The patient is stable for discharge from GI standpoint  GI will sign off, call with questions

## 2020-03-09 NOTE — PROGRESS NOTES
Pt stating he wants to sign out AMA  Telemetry removed, MASIMO removed, IV removed  Dr Adeline Bill made aware

## 2020-03-09 NOTE — OCCUPATIONAL THERAPY NOTE
Occupational Therapy Evaluation      Holden Ackerman    3/9/2020    Principal Problem:    Syncope  Active Problems:    Light-headedness    Insect bite of right hand    Transaminitis, mild    Alcohol abuse      History reviewed  No pertinent past medical history  History reviewed  No pertinent surgical history  03/09/20 1110   Note Type   Note type Eval/Treat   Restrictions/Precautions   Weight Bearing Precautions Per Order No   Other Precautions Multiple lines;Telemetry; Fall Risk   Pain Assessment   Pain Assessment 0-10  (Simultaneous filing  User may not have seen previous data )   Pain Score No Pain   Home Living   Type of [address] One level;Stairs to enter with rails; Able to live on main level with bedroom/bathroom; Performs ADLs on one level  (2STE)   Bathroom Shower/Tub Walk-in shower   Bathroom Toilet Standard   Bathroom Equipment Grab bars in shower   [address]   (no DME at baseline)   Prior Function   Level of [place] Independent with ADLs and functional mobility   Lives With Significant other   Receives Help From [facility] in the last 6 months 0   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[employer])   Lifestyle   Autonomy Pt reports PLOF was Ind with ADLs, IADLs, and driving  Reciprocal Relationships spouse   Psychosocial   Psychosocial (WDL) WDL   ADL   Eating Assistance 6  Modified independent   Grooming Assistance 6  Modified Independent   UB Bathing Assistance 6  Modified Independent   LB Bathing Assistance 6  Modified Independent   UB Dressing Assistance 6  Modified independent   LB Dressing Assistance 6  Modified independent   Toileting Assistance  6  Modified independent   Functional Assistance 6  Modified independent   Bed Mobility   Additional Comments Pt was sitting on EOB when OT arrived   Transfers   Sit to Stand 5  Supervision   Additional items Assist x 1; Increased time required   Stand to Sit 5  Supervision   Additional items Assist x 1; Increased time required   Functional Mobility   Functional Mobility 4  Minimal assistance  (CGA - stated "I feel shaky")   Additional Comments declined use of RW   Balance   Static Sitting Fair +   Dynamic Sitting Fair +   Static Standing Fair -   Dynamic Standing Poor +   Ambulatory Poor +   Activity Tolerance   Activity Tolerance Patient limited by fatigue   Nurse Made Aware Yes, spoke with pt's RN, Terri Arroyo, who stated pt was appropriate for OT and made aware of outcomes   RUE Assessment   RUE Assessment WFL  (4/5)   LUE Assessment   LUE Assessment WFL  (4/5)   Hand Function   Gross Motor Coordination Functional   Fine Motor Coordination Functional   Sensation   Light Touch No apparent deficits   Sharp/Dull No apparent deficits   Stereognosis No apparent deficits   Proprioception   Proprioception No apparent deficits   Vision-Basic Assessment   Current Vision No visual deficits   Vision - Complex Assessment   Ocular Range of Motion WFL   Perception   Inattention/Neglect Appears intact   Cognition   Overall Cognitive Status WFL   Arousal/Participation Alert;Arousable; Cooperative   Attention Within functional limits   Orientation Level Oriented X4   Memory Within functional limits   Following Commands Follows all commands and directions without difficulty   Comments Pt was agreeable to OT eval   Assessment   Limitation Decreased self-care trans;Decreased high-level ADLs   Prognosis Good   Assessment Pt is a 48 y o  male seen for OT evaluation s/p admit to [facility] on 3/5/2020 w/ Syncope  Comorbidities affecting pt's functional performance at time of assessment include:syncope, transaminitis (mild), insect bite of right hand, and tobacco abuse   Orders placed for OT evaluation and treatment and "act as jordan" order  Performed at least two patient identifiers during session including name and wristband   Personal factors affecting pt at time of IE include:steps to enter environment, difficulty performing IADLS  and health management   Prior to admission, pt reports Ind with ADLs, Ind with IADLs, and (+) driving  Upon evaluation: Pt requires MI with UB ADLs, MI with LB ADLs, S with xfers and min A with functional mobility 2* the following deficits impacting occupational performance: weakness, decreased activity tolerance, environmental deficits and requiring external assistance to complete transitional movements  Pt to benefit from continued skilled OT tx while in the hospital to address deficits as defined above and maximize level of functional independence w ADL's and functional mobility  Occupational Performance areas to address include: medication management and household maintenance  From OT standpoint, recommendation at time of d/c would be home no needs  Plan   Treatment Interventions Compensatory technique education;Continued evaluation;Cardiac education; Energy conservation; Activityengagement;Equipment evaluation/education; Endurance training   Goal Expiration Date 03/22/20   OT Frequency 2-3x/wk   Recommendation   OT Discharge Recommendation Home with family support   Barthel Index   Feeding 10   Bathing 5   Grooming Score 5   Dressing Score 10   Bladder Score 10   Bowels Score 10   Toilet Use Score 10   Transfers (Bed/Chair) Score 10   Mobility (Level Surface) Score 10   Stairs Score 0   Barthel Index Score 80   Modified [place] Scale   Modified [place] Scale 4     Occupational therapy Goals: In 2-4 days    Patient will completed passport to independence program in order to further determine DC needs as well as any additional recommendations/ education  Patient will verbalize and demonstrate use of energy conservation/ deep breathing technique and work simplification skills during functional activity with no verbal cues      Patient will verbalize and demonstrate good body mechanics and joint protection techniques during ADLs/ IADLs with no verbal cues     Patient will increase OOB/ sitting tolerance to 4-6 hours per day for increased participation in self care and leisure tasks with no s/s of exertion  Patient will identify s/s of exertion during ADL and functional mobility with no verbal cues  Patient will verbalize/ demonstrate compensatory strategies to recover from exertion with no verbal cues  Patient will increase standing tolerance time to 10 minutes with No UE support to complete sink level ADLs @ Mod I level  Patient will increase sitting tolerance at edge of bed to 30 minutes to complete UB ADLs @ Indep  level       Patient/ Family will demonstrate competency with UE Home Exercise Program      Brenna Bryant MS OTR/L

## 2020-03-09 NOTE — DISCHARGE SUMMARY
Discharge- Tamela Chi 1965, 48 y o  male MRN: [id number]    Unit/Bed#: -01 Encounter: [id number]    Primary Care Provider: No primary care provider on file  Date and time admitted to hospital: 3/5/2020  1:18 PM        Cardiomyopathy [facility])  Assessment & Plan  Mildly depressed ejection fraction of 45-50% according to the 2D echo  Cardiology recommend start lisinopril 2 5 mg daily  Alcohol abuse  Assessment & Plan  · Reports consumes approximately 1/2 of a 5th of Vodka daily  Received Ativan 1mg PO in ED  · Continue Folic acid, Thiamine and MVI daily  · Provided with a short taper course of Librium upon discharge  Transaminitis, mild  Assessment & Plan  Transaminitis is improved  No need for steroidal treatment, per GI  Patient was strongly encouraged to abstain from alcohol use  Light-headedness  Assessment & Plan  · Improved  Patient did well with physical therapy  · Do not suspect vertigo, however, most likely this is related to dehydration due to heavy alcohol use  · Encouraged patient to quit drinking  Less likely cardiac etiology  No events on the monitor  2D echo revealed cardiomyopathy with slightly depressed ejection fraction  * Syncope  Assessment & Plan  · Reports 3 episodes on pre-syncope yesterday (3/4/20) was seen at [facility] and discharged home  This morning, around 0800, he began feeling weak and was holding onto the wall after which S O witnessed him fall to his hands and knees  S O states he briefly lost consciousness and "eyes rolled back " Reports has been having light-headedness, dizziness and blurred vision intermittently since Monday  · CT head at [facility] (-)  · Troponin negative x3  ·  12 lead EKG reveals SR w/ poor R wave progression with no acute ischemic changes  · CTA chest:  Revealed mild COPD  · Carotid doppler was normal  · Cardiology follows  · 2D echo reveals slightly decreased ejection fraction at 45-50%    Per Cardiology, 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  Discharge Summary - [facility]    Patient Information: Bren Law 48 y o  male MRN: [id number]  Unit/Bed#: -01 Encounter: [id number]    Discharging Physician / Practitioner: Devan Faustin MD  PCP: No primary care provider on file  Admission Date: 3/5/2020  Discharge Date: 03/09/20    Reason for Admission:  Syncope    Discharge Diagnoses:     Principal Problem:    Syncope  Active Problems:    Light-headedness    Insect bite of right hand    Transaminitis, mild    Alcohol abuse    Cardiomyopathy ([address] )  Resolved Problems:    * No resolved hospital problems  *      Consultations During Hospital Stay:  · Cardiology  · GI    Procedures Performed:     · Cardiac stress test    Significant Findings / Test Results:   Liver ultrasound:  Mildly increased echotexture likely representing hepatic steatosis  2D echo: Systolic function was mildly reduced  Ejection fraction was estimated in the range of 45 % to 50 %  There was possible mild hypokinesis of the mid anteroseptal, mid inferoseptal, and apical septal wall(s)  Doppler parameters were consistent with abnormal left ventricular relaxation (grade 1 diastolic dysfunction)  Elevated ALT and AST  Incidental Findings:   · As above    Test Results Pending at Discharge (will require follow up): · None     Outpatient Tests Requested:  · None    Complications:  None    Hospital Course:     Bren Law is a 48 y o  male patient who originally presented to the hospital on 3/5/2020 due to a single episode of syncope that was witnessed by S O  Reports 3 episodes on pre-syncope yesterday (3/4/20) was seen at [facility] and discharged home  This morning, around 0800, he began feeling weak and was holding onto the wall after which S O witnessed him fall to his hands and knees   S O states he briefly lost consciousness and "eyes rolled back " Reports has been having light-headedness, dizziness and blurred vision intermittently since Monday  Chronic alcoholism  States drink 1/2 of a 5th of Vodka daily with (+) hx of alcohol withdrawals  Denies chest pain, palpitations or SOB     Patient has been workup for syncope  Telemetry did not reveal any abnormalities, except for episodes of bradycardia  Patient had 2D echo done which will slightly depressed ejection fraction 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  Cardiac stress test was negative for ischemia  Patient will be started on lisinopril 2 5 mg upon discharge  Patient also developed mild transient transaminitis with liver ultrasound consistent with hepatic steatosis  Acute hepatitis panel was negative  Per GI, no need to treat with steroids  Patient will continue to work on alcohol abstinence  Provided with Librium upon discharge to prevent withdrawal symptoms  Patient should continue with folic acid and thiamine  I have suggested alcohol rehab the patient, but he deferred at this time "due to a busy work schedule and the need to pay for his house"  Condition at Discharge: stable     Discharge Day Visit / Exam:     Subjective:  Patient was seen and examined today  He denies any complaints and he is very anxious about going home as soon as possible  Vitals: Blood Pressure: 117/65 (03/09/20 1500)  Pulse: 68 (03/09/20 1500)  Temperature: 98 1 °F (36 7 °C) (03/09/20 1500)  Temp Source: Oral (03/09/20 1500)  Respirations: 17 (03/09/20 1500)  Height: 5' 9" (175 3 cm) (03/05/20 1957)  Weight - Scale: 58 9 kg (129 lb 13 6 oz) (03/05/20 1957)  SpO2: 98 % (03/09/20 1500)  Exam:   Physical Exam   HENT:   Head: Normocephalic and atraumatic  Eyes: Pupils are equal, round, and reactive to light  Neck: Normal range of motion  Cardiovascular: Normal rate, regular rhythm and normal heart sounds     Pulmonary/Chest: Effort normal and breath sounds normal  No respiratory distress  Abdominal: Soft  Bowel sounds are normal  There is no tenderness  Musculoskeletal: He exhibits no edema or deformity  Neurological: He is alert  Skin: Skin is warm  No rash noted  No erythema  Psychiatric: He has a normal mood and affect  Discussion with Family:  Patient's son    Discharge instructions/Information to patient and family:   See after visit summary for information provided to patient and family  Provisions for Follow-Up Care:  See after visit summary for information related to follow-up care and any pertinent home health orders  Disposition:     Home    For Discharges to [facility] SNF:   · Not Applicable to this Patient - Not Applicable to this Patient    Planned Readmission: no     Discharge Statement:  I spent 40 minutes discharging the patient  This time was spent on the day of discharge  I had direct contact with the patient on the day of discharge  Greater than 50% of the total time was spent examining patient, answering all patient questions, arranging and discussing plan of care with patient as well as directly providing post-discharge instructions  Additional time then spent on discharge activities  Discharge Medications:  See after visit summary for reconciled discharge medications provided to patient and family        ** Please Note: This note has been constructed using a voice recognition system **

## 2020-03-09 NOTE — PROGRESS NOTES
Progress Note- Andi Machado 48 y o  male MRN: [id number]    Unit/Bed#: -01 Encounter: [id number]      Assessment and Plan:    1  Transaminitis:  Likely 2/2 to mild alcoholic hepatitis given patient's heavy alcohol use and pattern of liver enzymes  His discriminant function is within normal limits he does not for treatment with prednisone  Will continue to monitor his INR and bilirubin  Consulted patient with regards to alcohol abuse and need to stop drinking  Would recommend social work consult for helping patient with alcohol cessation    ______________________________________________________________________    Subjective:     No acute events overnight  Patient remains afebrile  No evidence of delirium tremens  There was mild elevation of his transaminases on blood work      Medication Administration - last 24 hours from 03/07/2020 2316 to 03/08/2020 2316       Date/Time Order Dose Route Action Action by     03/08/2020 0527 chlordiazePOXIDE (LIBRIUM) capsule 25 mg 25 mg Oral Given Kay Snyder RN     03/07/2020 2332 chlordiazePOXIDE (LIBRIUM) capsule 25 mg 25 mg Oral Given Kay Snyder RN     03/08/2020 2003 multi-electrolyte (PLASMALYTE-A/ISOLYTE-S PH 7 4) IV solution 100 mL/hr Intravenous Jarredet 37 [facility]     03/08/2020 1005 multi-electrolyte (PLASMALYTE-A/ISOLYTE-S PH 7 4) IV solution 100 mL/hr Intravenous 6041 [facility]     03/08/2020 9438 thiamine (VITAMIN B1) tablet 100 mg 100 mg Oral Given Merissa Reagan RN     29/24/1928 6451 folic acid (FOLVITE) tablet 1 mg 1 mg Oral Given Merissa Reagan, CHATA     03/08/2020 7124 multivitamin-minerals (CENTRUM) tablet 1 tablet 1 tablet Oral Given Merissa Reagan, CHATA     03/08/2020 1007 LORazepam (ATIVAN) tablet 0 5 mg 0 5 mg Oral Given Merissa Reagan RN     03/08/2020 2149 nicotine (NICODERM CQ) 21 mg/24 hr TD 24 hr patch 21 mg 21 mg Transdermal Medication Applied Raven Covington RN     03/08/2020 2148 nicotine (NICODERM CQ) 21 mg/24 hr TD 24 hr patch 21 mg 21 mg Transdermal Patch Removed Jignesh Condon, CHATA     03/08/2020 2149 heparin (porcine) subcutaneous injection 5,000 Units 5,000 Units Subcutaneous Given Jignesh Condon RN     03/08/2020 1434 heparin (porcine) subcutaneous injection 5,000 Units 5,000 Units Subcutaneous Given Anh Carrasquillo RN     03/08/2020 7330 heparin (porcine) subcutaneous injection 5,000 Units 5,000 Units Subcutaneous Given Elsy Ho RN     03/08/2020 1959 chlordiazePOXIDE (LIBRIUM) capsule 25 mg 25 mg Oral Given Jignesh Condon RN     03/08/2020 1645 chlordiazePOXIDE (LIBRIUM) capsule 25 mg 25 mg Oral Given Anh Carrasquillo RN     03/08/2020 1154 chlordiazePOXIDE (LIBRIUM) capsule 25 mg 25 mg Oral Given Anh Carrasquillo RN          Objective:     Vitals: Blood pressure 112/61, pulse 57, temperature 97 8 °F (36 6 °C), temperature source Oral, resp  rate 18, height 5' 9" (1 753 m), weight 58 9 kg (129 lb 13 6 oz), SpO2 100 %  ,Body mass index is 19 18 kg/m²  Intake/Output Summary (Last 24 hours) at 3/8/2020 2316  Last data filed at 3/8/2020 2101  Gross per 24 hour   Intake 1140 ml   Output 1600 ml   Net -460 ml       Physical Exam:   General Appearance: Awake and alert, in no acute distress  Abdomen: Soft, non-tender, non-distended; bowel sounds normal; no masses or no organomegaly    Invasive Devices     Peripheral Intravenous Line            Peripheral IV 03/08/20 Dorsal (posterior); Right Forearm less than 1 day                Lab Results:  Admission on 03/05/2020   Component Date Value    WBC 03/05/2020 4 37     RBC 03/05/2020 3 91     Hemoglobin 03/05/2020 13 8     Hematocrit 03/05/2020 41 0     MCV 03/05/2020 105*    MCH 03/05/2020 35 3*    MCHC 03/05/2020 33 7     RDW 03/05/2020 15 0     MPV 03/05/2020 10 1     Platelets 16/40/5901 120*    nRBC 03/05/2020 0     Neutrophils Relative 03/05/2020 47     Immat GRANS % 03/05/2020 0  Lymphocytes Relative 03/05/2020 37     Monocytes Relative 03/05/2020 9     Eosinophils Relative 03/05/2020 4     Basophils Relative 03/05/2020 3*    Neutrophils Absolute 03/05/2020 2 10     Immature Grans Absolute 03/05/2020 0 01     Lymphocytes Absolute 03/05/2020 1 61     Monocytes Absolute 03/05/2020 0 37     Eosinophils Absolute 03/05/2020 0 16     Basophils Absolute 03/05/2020 0 12*    Sodium 03/05/2020 146*    Potassium 03/05/2020 4 1     Chloride 03/05/2020 107     CO2 03/05/2020 28     ANION GAP 03/05/2020 11     BUN 03/05/2020 12     Creatinine 03/05/2020 0 70     Glucose 03/05/2020 86     Calcium 03/05/2020 8 1*    AST 03/05/2020 130*    ALT 03/05/2020 88*    Alkaline Phosphatase 03/05/2020 59     Total Protein 03/05/2020 6 8     Albumin 03/05/2020 3 5     Total Bilirubin 03/05/2020 0 80     eGFR 03/05/2020 109     Troponin I 03/05/2020 <0 02     NT-proBNP 03/05/2020 59     TSH 3RD GENERATON 03/05/2020 0 751     D-Dimer, Quant 03/05/2020 1 26*    Troponin I 03/05/2020 <0 02     Ventricular Rate 03/05/2020 61     Atrial Rate 03/05/2020 61     CO Interval 03/05/2020 156     QRSD Interval 03/05/2020 104     QT Interval 03/05/2020 458     QTC Interval 03/05/2020 461     P Axis 03/05/2020 63     QRS Axis 03/05/2020 88     T Wave Axis 03/05/2020 30     Troponin I 03/05/2020 0 02     Sodium 03/06/2020 139     Potassium 03/06/2020 3 9     Chloride 03/06/2020 102     CO2 03/06/2020 29     ANION GAP 03/06/2020 8     BUN 03/06/2020 15     Creatinine 03/06/2020 0 84     Glucose 03/06/2020 95     Calcium 03/06/2020 8 5     AST 03/06/2020 92*    ALT 03/06/2020 73     Alkaline Phosphatase 03/06/2020 63     Total Protein 03/06/2020 6 2*    Albumin 03/06/2020 3 0*    Total Bilirubin 03/06/2020 1 60*    eGFR 03/06/2020 101     Magnesium 03/06/2020 1 6     WBC 03/06/2020 4 42     RBC 03/06/2020 3 76*    Hemoglobin 03/06/2020 13 4     Hematocrit 03/06/2020 38 7  MCV 03/06/2020 103*    MCH 03/06/2020 35 6*    MCHC 03/06/2020 34 6     RDW 03/06/2020 14 3     MPV 03/06/2020 10 5     Platelets 69/32/0491 109*    nRBC 03/06/2020 0     Neutrophils Relative 03/06/2020 55     Immat GRANS % 03/06/2020 1     Lymphocytes Relative 03/06/2020 24     Monocytes Relative 03/06/2020 14*    Eosinophils Relative 03/06/2020 4     Basophils Relative 03/06/2020 2*    Neutrophils Absolute 03/06/2020 2 48     Immature Grans Absolute 03/06/2020 0 02     Lymphocytes Absolute 03/06/2020 1 05     Monocytes Absolute 03/06/2020 0 60     Eosinophils Absolute 03/06/2020 0 18     Basophils Absolute 03/06/2020 0 09     Bilirubin, Direct 03/06/2020 0 32*    Sodium 03/07/2020 135*    Potassium 03/07/2020 3 9     Chloride 03/07/2020 103     CO2 03/07/2020 26     ANION GAP 03/07/2020 6     BUN 03/07/2020 13     Creatinine 03/07/2020 0 63     Glucose 03/07/2020 101     Glucose, Fasting 03/07/2020 101*    Calcium 03/07/2020 8 4     AST 03/07/2020 284*    ALT 03/07/2020 148*    Alkaline Phosphatase 03/07/2020 72     Total Protein 03/07/2020 6 2*    Albumin 03/07/2020 3 0*    Total Bilirubin 03/07/2020 1 70*    eGFR 03/07/2020 113     Cholesterol 03/07/2020 199     Triglycerides 03/07/2020 83     HDL, Direct 03/07/2020 60     LDL Calculated 03/07/2020 122*    Sodium 03/08/2020 137     Potassium 03/08/2020 3 8     Chloride 03/08/2020 101     CO2 03/08/2020 27     ANION GAP 03/08/2020 9     BUN 03/08/2020 14     Creatinine 03/08/2020 0 70     Glucose 03/08/2020 98     Calcium 03/08/2020 9 1     AST 03/08/2020 407*    ALT 03/08/2020 258*    Alkaline Phosphatase 03/08/2020 68     Total Protein 03/08/2020 6 8     Albumin 03/08/2020 3 4*    Total Bilirubin 03/08/2020 1 50*    eGFR 03/08/2020 109     Protime 03/08/2020 12 4     INR 03/08/2020 0 92     PTT 03/08/2020 29        Imaging Studies: I have personally reviewed pertinent imaging studies

## 2020-03-09 NOTE — SOCIAL WORK
CM met w/pt to discuss PT/OT outpatient PT v  home with family  Pt declined outpatient rehab at this time  Pt advised he will f/u w/PCP regarding therapy if he decides he wants rehab in the future  No additional CM needs noted

## 2020-03-09 NOTE — PLAN OF CARE
Problem: PAIN - ADULT  Goal: Verbalizes/displays adequate comfort level or baseline comfort level  Description  Interventions:  - Encourage patient to monitor pain and request assistance  - Assess pain using appropriate pain scale  - Administer analgesics based on type and severity of pain and evaluate response  - Implement non-pharmacological measures as appropriate and evaluate response  - Consider cultural and social influences on pain and pain management  - Notify physician/advanced practitioner if interventions unsuccessful or patient reports new pain  Outcome: Progressing     Problem: INFECTION - ADULT  Goal: Absence or prevention of progression during hospitalization  Description  INTERVENTIONS:  - Assess and monitor for signs and symptoms of infection  - Monitor lab/diagnostic results  - Monitor all insertion sites, i e  indwelling lines, tubes, and drains  - Monitor endotracheal if appropriate and nasal secretions for changes in amount and color  - [place] appropriate cooling/warming therapies per order  - Administer medications as ordered  - Instruct and encourage patient and family to use good hand hygiene technique  - Identify and instruct in appropriate isolation precautions for identified infection/condition  Outcome: Progressing  Goal: Absence of fever/infection during neutropenic period  Description  INTERVENTIONS:  - Monitor WBC    Outcome: Progressing     Problem: SAFETY ADULT  Goal: Patient will remain free of falls  Description  INTERVENTIONS:  - Assess patient frequently for physical needs  -  Identify cognitive and physical deficits and behaviors that affect risk of falls    -  [place] fall precautions as indicated by assessment   - Educate patient/family on patient safety including physical limitations  - Instruct patient to call for assistance with activity based on assessment  - Modify environment to reduce risk of injury  - Consider OT/PT consult to assist with strengthening/mobility  Outcome: Progressing  Goal: Maintain or return to baseline ADL function  Description  INTERVENTIONS:  -  Assess patient's ability to carry out ADLs; assess patient's baseline for ADL function and identify physical deficits which impact ability to perform ADLs (bathing, care of mouth/teeth, toileting, grooming, dressing, etc )  - Assess/evaluate cause of self-care deficits   - Assess range of motion  - Assess patient's mobility; develop plan if impaired  - Assess patient's need for assistive devices and provide as appropriate  - Encourage maximum independence but intervene and supervise when necessary  - Involve family in performance of ADLs  - Assess for home care needs following discharge   - Consider OT consult to assist with ADL evaluation and planning for discharge  - Provide patient education as appropriate  Outcome: Progressing  Goal: Maintain or return mobility status to optimal level  Description  INTERVENTIONS:  - Assess patient's baseline mobility status (ambulation, transfers, stairs, etc )    - Identify cognitive and physical deficits and behaviors that affect mobility  - Identify mobility aids required to assist with transfers and/or ambulation (gait belt, sit-to-stand, lift, walker, cane, etc )  - [place] fall precautions as indicated by assessment  - Record patient progress and toleration of activity level on Mobility SBAR; progress patient to next Phase/Stage  - Instruct patient to call for assistance with activity based on assessment  - Consider rehabilitation consult to assist with strengthening/weightbearing, etc   Outcome: Progressing     Problem: DISCHARGE PLANNING  Goal: Discharge to home or other facility with appropriate resources  Description  INTERVENTIONS:  - Identify barriers to discharge w/patient and caregiver  - Arrange for needed discharge resources and transportation as appropriate  - Identify discharge learning needs (meds, wound care, etc )  - Arrange for interpretive services to assist at discharge as needed  - Refer to Case Management Department for coordinating discharge planning if the patient needs post-hospital services based on physician/advanced practitioner order or complex needs related to functional status, cognitive ability, or social support system  Outcome: Progressing     Problem: Knowledge Deficit  Goal: Patient/family/caregiver demonstrates understanding of disease process, treatment plan, medications, and discharge instructions  Description  Complete learning assessment and assess knowledge base  Interventions:  - Provide teaching at level of understanding  - Provide teaching via preferred learning methods  Outcome: Progressing     Problem: Potential for Falls  Goal: Patient will remain free of falls  Description  INTERVENTIONS:  - Assess patient frequently for physical needs  -  Identify cognitive and physical deficits and behaviors that affect risk of falls    -  [place] fall precautions as indicated by assessment   - Educate patient/family on patient safety including physical limitations  - Instruct patient to call for assistance with activity based on assessment  - Modify environment to reduce risk of injury  - Consider OT/PT consult to assist with strengthening/mobility  Outcome: Progressing

## 2020-03-09 NOTE — PHYSICAL THERAPY NOTE
03/09/20 1255   Pain Assessment   Pain Assessment No/denies pain   Pain Score No Pain   Restrictions/Precautions   Weight Bearing Precautions Per Order No   Other Precautions Telemetry; Fall Risk   General   Chart Reviewed Yes   Family/Caregiver Present Yes   Cognition   Orientation Level Oriented X4   Subjective   Subjective pt agreeable to particpate in P T  tx today  Bed Mobility   Supine to Sit 5  Supervision   Sit to Supine 5  Supervision   Transfers   Sit to Stand 5  Supervision   Stand to Sit 5  Supervision   Ambulation/Elevation   Gait pattern Narrow OLLIE   Gait Assistance 5  Supervision   Additional items Assist x 1;Verbal cues   Assistive Device None   Distance 100'x2   Stair Management Assistance 4  Minimal assist   Additional items Assist x 1;Verbal cues   Stair Management Technique One rail L;Alternating pattern; Foreward;Reciprocal   Number of Stairs   (6x6")   Balance   Static Sitting Fair +   Dynamic Sitting Fair +   Static Standing Fair   Dynamic Standing [address] -   Activity Tolerance   Nurse Made Aware Lillie BRIZUELA   Exercises   TKR Sitting;Supine;25 reps;AROM   Assessment   Prognosis Good   Problem List Impaired balance;Decreased coordination;Decreased safety awareness;Decreased strength;Decreased endurance   Assessment Pt tolerated todays tx well  He was able to progress endurance with gait training to 100'x2 with no AD and supervision  Pt completed 6 steps on the stairs with VCs for technique and safety  Pt had little difficulty with exercises performed this session  Pt would benefit from out patient P T  Once d/c to maximize functional mobility  Continue with current POC  Goals   Patient Goals to go home   STG Expiration Date 03/16/20   PT Treatment Day 1   Plan   Treatment/Interventions Functional transfer training;LE strengthening/ROM; Elevations; Therapeutic exercise; Endurance training;Patient/family training;Equipment eval/education; Bed mobility;Gait training;Spoke to nursing;Family   Progress Progressing toward goals   PT Frequency 5x/wk   Recommendation   Recommendation Outpatient PT   PT - OK to Discharge Yes   Additional Comments once medically cleared   Brigid Gary, PTA

## 2020-03-09 NOTE — ASSESSMENT & PLAN NOTE
Transaminitis is improved  No need for steroidal treatment, per GI  Patient was strongly encouraged to abstain from alcohol use

## 2020-03-09 NOTE — ASSESSMENT & PLAN NOTE
· Improved  Patient did well with physical therapy  · Do not suspect vertigo, however, most likely this is related to dehydration due to heavy alcohol use  · Encouraged patient to quit drinking  Less likely cardiac etiology  No events on the monitor  2D echo revealed cardiomyopathy with slightly depressed ejection fraction

## 2020-03-30 DIAGNOSIS — I42.9 CARDIOMYOPATHY (HCC): ICD-10-CM

## 2020-04-03 RX ORDER — LISINOPRIL 2.5 MG/1
TABLET ORAL
Qty: 30 TABLET | Refills: 0 | Status: SHIPPED | OUTPATIENT
Start: 2020-04-03 | End: 2020-12-04 | Stop reason: ALTCHOICE

## 2020-07-29 NOTE — UTILIZATION REVIEW
URGENT/EMERGENT  INPATIENT/SPU AUTHORIZATION REQUEST    Date: 07/29/20            # Pages in this Request:     x New Request   Additional Information for PA#:     Office Contact Name:  Jarad Gore Title: Utilization Review, [facility] Nurse     Phone: [phone]  Ext  Availability (Date/Time): Wednesday - Friday 8 am- 4 pm    x Inpatient Review  SPU Review        Current       x Late Pick-up   · How your facility was first notified of the Late Pick-up: Paths Letter   · When your facility was first notified of the Late Pick-up (date): 7/15/2020         RECIPIENT INFORMATION    Recipient ID#: [id number]   Recipient Name: Dave Caro   YOB: 1965  50 y o  Recipient Alias:     Gender:  X Male  Female Medicaid Eligibility ([address] [place] [address]): INSURANCE INFORMATION    (All other private or governmental health insurance benefits must be utilized prior to billing the [place] Program)    Was this admission the result of an MVA, other accident, assault, injury, fall, gunshot, bite etc ? Yes x No                   If yes, provide a brief description of the incident  Does the recipient have other insurance coverage? Yes x No        Insurance Company Name/Policy #      Did that insurance pay on this claim? Yes  No        Did that insurance deny this claim? Yes  No    If yes, reason for denial:      Does the recipient have Medicare? Yes x No        Did Medicare exhaust prior to this admission? Yes  No        Did Medicare partially pay this claim? Yes  No        Did that insurance deny this claim? Yes  No    If yes, reason for denial:          Was the recipient a prisoner at the time of admission?   Yes x No            PROVIDER INFORMATION    Hospital Name:  [address] [place] [address] Provider ID#: [id number]    [address] [place] [address] Physician Name: Homer Mitchell Provider ID#: [id number]        ADMISSION INFORMATION    Type of Admission: (please choose one)    X ED      Direct    If yes, from where?     Transfer    If yes, transferring hospital (inpatient, rehab, or psych) Provider Name/Provider ID#: Admission Floor or Unit Type: Med Surg     Dates/Times:        ED Date/Time: 3/5/2020  1:18 PM        Observation Date/Time: 3/5/2020  18:41        Admission Date/Time: 3/7/20 0903        Discharge or Transfer Date/Time: 3/9/2020  5:42 PM        DIAGNOSIS/PROCEDURE CODES    Primary Diagnosis Code/Primary Diagnosis Code description:  N39 8 Alcoholic cardiomyopathy   F10 239 Alcohol dependence with withdrawal, unspecified   R74 0 Nonspecific elevation of levels of transaminase and lactic acid dehydrogenase (ldh)   E86 0 Dehydration    Additional Diagnosis Code(s) and Description(s)-(up to three additional codes):    Procedure Code (one) and description:        CLINICAL INFORMATION - 2 [facility]    Is there a prior admission with a discharge date within 30 days of the date of this admission? X No (Proceed to the next section - "Clinical Information - General Review Checklist:)      Yes (Provide the following information)     Prior admission dates:    MA Prior Authorization Number:        Review Outcome:     Diagnosis Code(s)/Description:    Procedure Code/Description:    Findings:    Treatment:    Condition on Discharge:   Vitals:    Labs:   Imaging:   Medications: Follow-up Instructions:    Disposition:        CLINICAL INFORMATION - GENERAL REVIEW CHECKLIST    EMERGENCY DEPARTMENT: (Proceed to "ADMISSION" if Direct Admission)    Presenting Signs/Symptoms: 48year old male to the ED from home with complaints of syncopal episode, dizziness, lightheaded  Admitted under observation for syncope  6 days prior, he awoke with a presumed insect bite and swelling of right hand  A day prior to his arrival to ED, was seen at another ED, had extensive blood work and CT of head which were negative  Dizziness has been getting progressively worse   ON the day of his arrival, he stood up, was walking, became so lightheaded, he lowered himself to the ground and blacked out  Has had blurry vision for 6 days  Visual acuity 20/40 in the R eye, 20/30 in the L eye   GCS=15  Strength strong and symmetric  Noted to have 2 tiny puncture marks to right hand  Not swollen or red  INitial troponin -, continue to trend  CT head - at other hospital    Check carotid doppler  Check orthstatic vs   IV fluids initiated  CTA chest   H/o alcohol abuse with h/o withdrawals  Has tremors on exam   Given Ativan in ED  CIWA protocol  Mild transaminitis        Medication/treatment prior to arrival in the ED:    Past Medical History:   No Additional Past Medical History       Clinical Exam:    Initial Vital Signs: (Temp, Pulse, Resp, and BP)   ED Triage Vitals   Temperature Pulse Respirations Blood Pressure SpO2   03/05/20 1249 03/05/20 1249 03/05/20 1249 03/05/20 1249 03/05/20 1249   97 5 °F (36 4 °C) 72 16 128/75 98 %      Temp Source Heart Rate Source Patient Position - Orthostatic VS BP Location FiO2 (%)   03/05/20 1249 03/05/20 1249 03/05/20 1249 03/05/20 1249 --   Oral Monitor Sitting Left arm       Pain Score       03/05/20 2103       No Pain           Pertinent Repeat Vital Signs: (include times they were obtained)  Date/Time   Temp   Pulse   Resp   BP   MAP (mmHg)   SpO2   O2 Device   Patient Position - Orthostatic VS   03/06/20 0748                     None (Room air)      03/06/20 07:24:56   98 5 °F (36 9 °C)   64   18   131/72   92   96 %         03/06/20 03:04:56   98 3 °F (36 8 °C)   73   15   128/65   86   94 %   None (Room air)   Lying   03/06/20 0300                  95 %   None (Room air)      03/05/20 2300   99 2 °F (37 3 °C)   87   15   123/59   80   95 %   None (Room air)   Lying   03/05/20 2125      71   16         95 %         03/05/20 19:57:51   98 7 °F (37 1 °C)   56   19   118/59   79   94 %   None (Room air)      03/05/20 1830      64      111/63               03/05/20 1407      57   16   115/67               03/05/20 1402      52Abnormal       115/67                     Pertinent Sustained Findings: (include times they were obtained)  CIWA   3/5-5-1-1-1-3  3/6 - 1-1-4-2-4  3/7- 3-3-2-1-1  3/8 - 5-2-1  3/9-1-3-3    Weight in Kilograms:  Wt Readings from Last 1 Encounters:   03/05/20 58 9 kg (129 lb 13 6 oz)       Pertinent Labs (results):  Results from last 7 days   Lab Units 03/06/20  0450 03/05/20  1359   WBC Thousand/uL 4 42 4 37   HEMOGLOBIN g/dL 13 4 13 8   HEMATOCRIT % 38 7 41 0   PLATELETS Thousands/uL 109* 120*   NEUTROS ABS Thousands/µL 2 48 2 10                Results from last 7 days   Lab Units 03/06/20  0450 03/05/20  1359   SODIUM mmol/L 139 146*   POTASSIUM mmol/L 3 9 4 1   CHLORIDE mmol/L 102 107   CO2 mmol/L 29 28   ANION GAP mmol/L 8 11   BUN mg/dL 15 12   CREATININE mg/dL 0 84 0 70   EGFR ml/min/1 73sq m 101 109   CALCIUM mg/dL 8 5 8 1*   MAGNESIUM mg/dL 1 6  --             Results from last 7 days   Lab Units 03/06/20  0450 03/05/20  1359   AST U/L 92* 130*   ALT U/L 73 88*   ALK PHOS U/L 63 59   TOTAL PROTEIN g/dL 6 2* 6 8   ALBUMIN g/dL 3 0* 3 5   TOTAL BILIRUBIN mg/dL 1 60* 0 80                Results from last 7 days   Lab Units 03/06/20  0450 03/05/20  1359   GLUCOSE RANDOM mg/dL 95 86                 Results from last 7 days   Lab Units 03/05/20  2124 03/05/20  1640 03/05/20  1359   TROPONIN I ng/mL 0 02 <0 02 <0 02           Results from last 7 days   Lab Units 03/05/20  1445   D-DIMER QUANTITATIVE ug/ml FEU 1 26*           Results from last 7 days   Lab Units 03/05/20  1359   TSH 3RD GENERATON uIU/mL 0 751           Results from last 7 days   Lab Units 03/05/20  1359   NT-PRO BNP pg/mL 59        Radiology (results):  3/6 Carotid doppler:  CONCLUSION:  RIGHT:There is no evidence of disease throughout the extracranial carotid arteries  Vertebral artery flow is antegrade    There is no significant subclavian artery disease      LEFT:There is no evidence of disease throughout the extracranial carotid arteries  Vertebral artery flow is antegrade  There is no significant subclavian artery disease    3/5 CTA CHest:  No PE  Mild COPD with biapical pleural parenchymal scarring  No acute pulmonary process  EKG (results):   3/5 EKG:  Normal sinus rhythm  Poor R wave progression  Possible Anterior infarct , age undetermined  Abnormal ECG  No previous ECGs available  Other tests (results):    Tests pending final results:    Treatment in the ED:   Medication Administration from 03/05/2020 1246 to 03/05/2020 1952       Date/Time Order Dose Route Action     03/05/2020 1353 LORazepam (ATIVAN) tablet 1 mg 1 mg Oral Given     03/05/2020 1857 iohexol (OMNIPAQUE) 350 MG/ML injection (MULTI-DOSE) 85 mL 85 mL Intravenous Given           Other treatments:      Change in condition while in the ED:     Response to ED Treatment:          OBSERVATION: (Proceed to "ADMISSION" if Direct Admission)    Orders written during the observation period    Neuro checks every 4 hours  Tele  Bilirubin direct    Meds Name, dose, route, time, how may doses given:  chlordiazePOXIDE 25 mg Oral O[address]E [address]   folic acid 1 mg Oral Daily   multivitamin-minerals 1 tablet Oral Daily   nicotine 21 mg Transdermal Daily   thiamine 100 mg Oral Daily        PRN Meds Name, dose, route, time, how many doses given within the first 24 hrs :  bisacodyl 10 mg Rectal Daily PRN    calcium carbonate 1,000 mg Oral Daily PRN    LORazepam 0 5 mg Oral Q8H PRN 3/5 x1  3/6 x 2  3/7 x 1   meclizine 25 mg Oral Q8H PRN    ondansetron 4 mg Intravenous Q6H PRN        IVs Type, rate, and total amt  ordered/given:  multi-electrolyte 100 mL/hr Intravenous Continuous       Labs, imaging, other:  Consults and findings:3/6 Cardiology consult:His syncopal episode was following a hot shower in the setting of baseline volume depletion from limited hydration and alcohol abuse  He denies cardiac prodrome   Check echocardiogram,  monitor on telemetry  Given withdrawal symptoms, he will likely not be able to tolerate and exercise stress test  Consider evaluation for esophagitis/varices as a source of his chest pain  Continue IV hydration  Test Results during the observation period  Pertinent Lab tests (dates/results):  Culture results (blood, urine, spinal, wound, respiratory, etc ):  Imaging tests (dates/results):  EKG (dates/results): Other test (dates/results):  Tests pending (dates/results):    Surgical or Invasive Procedures during the observation period  Name of surgery/procedure:  Date & Time:  Patient Response:  Post-operative orders:  Operative Report/Findings:    Response to Treatment, Major Change in Condition, Major Charge in Treatment during the observation period  changed to Inpatient status on 3/7/20 for evaluation of worsening transaminitis, ongoing cardiac evaluation for syncope  ADMISSION:    DIRECT Admissions Only:    · Presenting Signs/Symptoms:   ·   · Medication/treatment prior to arrival:  ·   · Past Medical History:  ·   · Clinical Exam on admission:  ·   · Vital Signs on admission: (Temp, Pulse, Resp, and BP)  ·   · Weight in kilograms:     ALL Admissions:    Admission Orders and Other Orders written within the first 24 hrs after admission  Meds Name, dose, route, time, how may doses given:  chlordiazePOXIDE 25 mg Oral B9G [address]   folic acid 1 mg Oral Daily   heparin (porcine) 5,000 Units Subcutaneous Q8H [address]   multivitamin-minerals 1 tablet Oral Daily   nicotine 21 mg Transdermal Daily   thiamine 100 mg Oral Daily        PRN Meds Name, dose, route, time, how many doses given within the first 24 hrs :  acetaminophen 650 mg Oral Q6H PRN 3/7 x 1   bisacodyl 10 mg Rectal Daily PRN    calcium carbonate 1,000 mg Oral Daily PRN    LORazepam 0 5 mg Oral Q8H PRN 3/7 x 1  3/8 x 1   meclizine 25 mg Oral Q8H PRN    ondansetron 4 mg Intravenous Q6H PRN         IVs Type, rate, and total amt   ordered/given:  multi-electrolyte 100 mL/hr Intravenous Continuous        Labs, imaging, other:  Results from last 7 days   Lab Units 03/07/20  0506 03/06/20  0450 03/05/20  1359   SODIUM mmol/L 135* 139 146*   POTASSIUM mmol/L 3 9 3 9 4 1   CHLORIDE mmol/L 103 102 107   CO2 mmol/L 26 29 28   ANION GAP mmol/L 6 8 11   BUN mg/dL 13 15 12   CREATININE mg/dL 0 63 0 84 0 70   EGFR ml/min/1 73sq m 113 101 109   CALCIUM mg/dL 8 4 8 5 8 1*   MAGNESIUM mg/dL  --  1 6  --               Results from last 7 days   Lab Units 03/07/20  0506 03/06/20  1411 03/06/20  0450 03/05/20  1359   AST U/L 284*  --  92* 130*   ALT U/L 148*  --  73 88*   ALK PHOS U/L 72  --  63 59   TOTAL PROTEIN g/dL 6 2*  --  6 2* 6 8   ALBUMIN g/dL 3 0*  --  3 0* 3 5   TOTAL BILIRUBIN mg/dL 1 70*  --  1 60* 0 80   BILIRUBIN DIRECT mg/dL  --  0 32*  --   --           Consults and findings:   3/7 Cardiology note: ECHO pending  Bradycardia, no other event on telemetry  Internal Medicine note: No active withdrawal, tremors improving  Significant worsening of transaminitis today, AST is 284 and ALT is 148, Gastroenterology consulted, recommended RUQ US  The patient will continue to require additional inpatient hospital stay due to Transaminitis and cardiac workup    Gastroenterology - 3/7 - Differential could be alcoholic hepatitis given his AST to ALT ratio of 2-1 verses choledocholithiasis  Will obtain right upper quadrant ultrasound to further evaluate  Continue to trend LFTs  Will need comprehensive liver workup if ultrasound is negative      Date/Time   Temp   Pulse   Resp   BP     SpO2   O2 Device   Patient Position - Orthostatic VS   03/07/20 1146                 99 %   None (Room air)      03/07/20 1100   97 8 °   79   18   132/80     99 %      Sitting   03/07/20 0700   97 6    98   18   138/79     98 %      Lying   03/07/20 0300   98 4 °   56   18   118/70     98 %   None (Room air)   Lying       Test Results after admission  Pertinent Lab tests (dates/results):  Culture results (blood, urine, spinal, wound, respiratory, etc ):  Imaging tests (dates/results):  3/8 - US RUQ - 1  Mildly increased echotexture likely representing hepatic steatosis    2   Trace perihepatic fluid  EKG (dates/results): Other test (dates/results):  Tests pending (dates/results):    Surgical or Invasive Procedures  Name of surgery/procedure:  Date & Time:  Patient Response:  Post-operative orders:  Operative Report/Findings:    Response to Treatment, Major Change in Condition, Major Charge in Treatment anytime during admission  Hospital Course:      Sterling Cerda is a 48 y o  male patient who originally presented to the hospital on 3/5/2020 due to a single episode of syncope that was witnessed by S O  Reports 3 episodes on pre-syncope yesterday (3/4/20) was seen at [facility] ED )  and discharged home  This morning, around 0800, he began feeling weak and was holding onto the wall after which S O witnessed him fall to his hands and knees  S O states he briefly lost consciousness and "eyes rolled back " Reports has been having light-headedness, dizziness and blurred vision intermittently since Monday  Chronic alcoholism  States drink 1/2 of a 5th of Vodka daily with (+) hx of alcohol withdrawals  Denies chest pain, palpitations or SOB      Patient has been workup for syncope  Telemetry did not reveal any abnormalities, except for episodes of bradycardia  Patient had 2D echo done which will slightly depressed ejection fraction 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  Cardiac stress test was negative for ischemia  Patient will be started on lisinopril 2 5 mg upon discharge  Patient also developed mild transient transaminitis with liver ultrasound consistent with hepatic steatosis  Acute hepatitis panel was negative  Per GI, no need to treat with steroids  Patient will continue to work on alcohol abstinence  Provided with Librium upon discharge to prevent withdrawal symptoms    Patient should continue with folic acid and thiamine      I have suggested alcohol rehab the patient, but he deferred at this time "due to a busy work schedule and the need to pay for his house"  Disposition on Discharge  Home, Rehab, SNF, LTC, Shelter, etc : Home/Self Care    Cease to Breathe (CTB)  If a patient expires during an admission, in addition to the above information, please include:    Summary/timeline of the patient's decline in condition:    Medications and treatment:    Patient response to treatment:    Date and time patient ceased to breathe:        Is there a Readmission that follows this admission? Yes x No    If yes, provide dates:          InterQual Review    InterQual Criteria Met:  Yes x No  N/A        Please include the InterQual Review, InterQual year/version used, and the criteria selected:   Created Using Review Status Review Entered   InterQual® In Primary 3/6/2020 08:55       Criteria Set Name - Subset   LOC:Acute Adult-Syncope       Criteria Review   REVIEW SUMMARY     Patient: Fco Wu  Review Number: [id number]  Review Status: In Primary  Criteria Status: Not Met     Condition Specific: Yes        OUTCOMES  Outcome Type: Primary           REVIEW DETAILS     Product: Jonatan Barry Adult  Subset: Syncope      (Symptom or finding within 24h)         (Excludes PO medications unless noted)     Version: [employer] 2019 1  Minh Norman and [employer]®  © 2019 [employer] and/or one of its [place]  All Rights Reserved  CPT only © 2018 [employer]  All Rights Reserved  PLEASE SUBMIT THE COMPLETED FORM TO THE DEPARTMENT OF HUMAN SERVICES - DIVISION OF CLINICAL  REVIEW VIA FAX [phone] or VIA E-MAIL TO [email or web address]    Signature: Anne Patiño Date:  07/29/20    Confidentiality Notice: The documents accompanying this telecopy may contain confidential information belonging to the sender    The information is intended only for the use of the individual named above  If you are not the intended recipient, you are hereby notified  That any disclosure, copying, distribution or taking of any telecopy is strictly prohibited

## 2020-12-04 ENCOUNTER — HOSPITAL ENCOUNTER (EMERGENCY)
Facility: HOSPITAL | Age: 55
Discharge: HOME | End: 2020-12-04
Attending: EMERGENCY MEDICINE

## 2020-12-04 ENCOUNTER — APPOINTMENT (EMERGENCY)
Dept: RADIOLOGY | Facility: HOSPITAL | Age: 55
End: 2020-12-04

## 2020-12-04 VITALS
TEMPERATURE: 97.6 F | SYSTOLIC BLOOD PRESSURE: 117 MMHG | HEART RATE: 65 BPM | RESPIRATION RATE: 20 BRPM | DIASTOLIC BLOOD PRESSURE: 75 MMHG | HEIGHT: 69 IN | BODY MASS INDEX: 19.26 KG/M2 | OXYGEN SATURATION: 98 % | WEIGHT: 130 LBS

## 2020-12-04 DIAGNOSIS — F10.10 ALCOHOL ABUSE: ICD-10-CM

## 2020-12-04 DIAGNOSIS — R55 NEAR SYNCOPE: ICD-10-CM

## 2020-12-04 DIAGNOSIS — R53.1 GENERALIZED WEAKNESS: Primary | ICD-10-CM

## 2020-12-04 LAB
ALBUMIN SERPL BCP-MCNC: 3.7 G/DL (ref 3.5–5)
ALP SERPL-CCNC: 62 U/L (ref 46–116)
ALT SERPL W P-5'-P-CCNC: 38 U/L (ref 12–78)
ANION GAP SERPL CALCULATED.3IONS-SCNC: 6 MMOL/L (ref 4–13)
APTT PPP: 27 SECONDS (ref 23–37)
AST SERPL W P-5'-P-CCNC: 63 U/L (ref 5–45)
ATRIAL RATE: 55 BPM
BACTERIA UR QL AUTO: NORMAL /HPF
BASOPHILS # BLD AUTO: 0.16 THOUSANDS/ΜL (ref 0–0.1)
BASOPHILS NFR BLD AUTO: 3 % (ref 0–1)
BILIRUB SERPL-MCNC: 0.5 MG/DL (ref 0.2–1)
BILIRUB UR QL STRIP: NEGATIVE
BUN SERPL-MCNC: 17 MG/DL (ref 5–25)
CALCIUM SERPL-MCNC: 8.4 MG/DL (ref 8.3–10.1)
CHLORIDE SERPL-SCNC: 110 MMOL/L (ref 100–108)
CLARITY UR: CLEAR
CO2 SERPL-SCNC: 28 MMOL/L (ref 21–32)
COLOR UR: YELLOW
CREAT SERPL-MCNC: 0.61 MG/DL (ref 0.6–1.3)
EOSINOPHIL # BLD AUTO: 0.27 THOUSAND/ΜL (ref 0–0.61)
EOSINOPHIL NFR BLD AUTO: 5 % (ref 0–6)
ERYTHROCYTE [DISTWIDTH] IN BLOOD BY AUTOMATED COUNT: 15.5 % (ref 11.6–15.1)
GFR SERPL CREATININE-BSD FRML MDRD: 114 ML/MIN/1.73SQ M
GLUCOSE SERPL-MCNC: 97 MG/DL (ref 65–140)
GLUCOSE UR STRIP-MCNC: NEGATIVE MG/DL
HCT VFR BLD AUTO: 42.4 % (ref 36.5–49.3)
HGB BLD-MCNC: 14.4 G/DL (ref 12–17)
HGB UR QL STRIP.AUTO: ABNORMAL
IMM GRANULOCYTES # BLD AUTO: 0.04 THOUSAND/UL (ref 0–0.2)
IMM GRANULOCYTES NFR BLD AUTO: 1 % (ref 0–2)
INR PPP: 0.94 (ref 0.84–1.19)
KETONES UR STRIP-MCNC: ABNORMAL MG/DL
LACTATE SERPL-SCNC: 1.8 MMOL/L (ref 0.5–2)
LEUKOCYTE ESTERASE UR QL STRIP: NEGATIVE
LYMPHOCYTES # BLD AUTO: 1.69 THOUSANDS/ΜL (ref 0.6–4.47)
LYMPHOCYTES NFR BLD AUTO: 31 % (ref 14–44)
MCH RBC QN AUTO: 36.1 PG (ref 26.8–34.3)
MCHC RBC AUTO-ENTMCNC: 34 G/DL (ref 31.4–37.4)
MCV RBC AUTO: 106 FL (ref 82–98)
MONOCYTES # BLD AUTO: 0.47 THOUSAND/ΜL (ref 0.17–1.22)
MONOCYTES NFR BLD AUTO: 9 % (ref 4–12)
NEUTROPHILS # BLD AUTO: 2.89 THOUSANDS/ΜL (ref 1.85–7.62)
NEUTS SEG NFR BLD AUTO: 51 % (ref 43–75)
NITRITE UR QL STRIP: NEGATIVE
NON-SQ EPI CELLS URNS QL MICRO: NORMAL /HPF
NRBC BLD AUTO-RTO: 0 /100 WBCS
P AXIS: 65 DEGREES
PH UR STRIP.AUTO: 6 [PH]
PLATELET # BLD AUTO: 146 THOUSANDS/UL (ref 149–390)
PMV BLD AUTO: 9.7 FL (ref 8.9–12.7)
POTASSIUM SERPL-SCNC: 3.7 MMOL/L (ref 3.5–5.3)
PR INTERVAL: 160 MS
PROT SERPL-MCNC: 7.4 G/DL (ref 6.4–8.2)
PROT UR STRIP-MCNC: NEGATIVE MG/DL
PROTHROMBIN TIME: 12.1 SECONDS (ref 11.6–14.5)
QRS AXIS: 89 DEGREES
QRSD INTERVAL: 104 MS
QT INTERVAL: 482 MS
QTC INTERVAL: 461 MS
RBC # BLD AUTO: 3.99 MILLION/UL (ref 3.88–5.62)
RBC #/AREA URNS AUTO: NORMAL /HPF
SODIUM SERPL-SCNC: 144 MMOL/L (ref 136–145)
SP GR UR STRIP.AUTO: 1.02 (ref 1–1.03)
T WAVE AXIS: 63 DEGREES
TROPONIN I SERPL-MCNC: <0.02 NG/ML
TSH SERPL DL<=0.05 MIU/L-ACNC: 0.75 UIU/ML (ref 0.36–3.74)
UROBILINOGEN UR QL STRIP.AUTO: 1 E.U./DL
VENTRICULAR RATE: 55 BPM
WBC # BLD AUTO: 5.52 THOUSAND/UL (ref 4.31–10.16)
WBC #/AREA URNS AUTO: NORMAL /HPF

## 2020-12-04 PROCEDURE — 96360 HYDRATION IV INFUSION INIT: CPT

## 2020-12-04 PROCEDURE — 83605 ASSAY OF LACTIC ACID: CPT | Performed by: PHYSICIAN ASSISTANT

## 2020-12-04 PROCEDURE — 93010 ELECTROCARDIOGRAM REPORT: CPT | Performed by: INTERNAL MEDICINE

## 2020-12-04 PROCEDURE — 71045 X-RAY EXAM CHEST 1 VIEW: CPT

## 2020-12-04 PROCEDURE — 99285 EMERGENCY DEPT VISIT HI MDM: CPT

## 2020-12-04 PROCEDURE — 80053 COMPREHEN METABOLIC PANEL: CPT | Performed by: PHYSICIAN ASSISTANT

## 2020-12-04 PROCEDURE — 36415 COLL VENOUS BLD VENIPUNCTURE: CPT | Performed by: PHYSICIAN ASSISTANT

## 2020-12-04 PROCEDURE — 84484 ASSAY OF TROPONIN QUANT: CPT | Performed by: PHYSICIAN ASSISTANT

## 2020-12-04 PROCEDURE — 85730 THROMBOPLASTIN TIME PARTIAL: CPT | Performed by: PHYSICIAN ASSISTANT

## 2020-12-04 PROCEDURE — 85610 PROTHROMBIN TIME: CPT | Performed by: PHYSICIAN ASSISTANT

## 2020-12-04 PROCEDURE — 81001 URINALYSIS AUTO W/SCOPE: CPT | Performed by: PHYSICIAN ASSISTANT

## 2020-12-04 PROCEDURE — 99285 EMERGENCY DEPT VISIT HI MDM: CPT | Performed by: PHYSICIAN ASSISTANT

## 2020-12-04 PROCEDURE — 84443 ASSAY THYROID STIM HORMONE: CPT | Performed by: PHYSICIAN ASSISTANT

## 2020-12-04 PROCEDURE — 85025 COMPLETE CBC W/AUTO DIFF WBC: CPT | Performed by: PHYSICIAN ASSISTANT

## 2020-12-04 PROCEDURE — 93005 ELECTROCARDIOGRAM TRACING: CPT

## 2020-12-04 RX ORDER — FLUTICASONE PROPIONATE 50 MCG
1 SPRAY, SUSPENSION (ML) NASAL DAILY
Status: DISCONTINUED | OUTPATIENT
Start: 2020-12-04 | End: 2020-12-04 | Stop reason: HOSPADM

## 2020-12-04 RX ADMIN — SODIUM CHLORIDE 1000 ML: 0.9 INJECTION, SOLUTION INTRAVENOUS at 07:56

## 2020-12-04 RX ADMIN — CARBAMIDE PEROXIDE 6.5% 10 DROP: 6.5 LIQUID AURICULAR (OTIC) at 10:01

## 2020-12-04 RX ADMIN — FLUTICASONE PROPIONATE 1 SPRAY: 50 SPRAY, METERED NASAL at 10:01

## 2021-12-01 ENCOUNTER — HOSPITAL ENCOUNTER (EMERGENCY)
Facility: HOSPITAL | Age: 56
Discharge: HOME | End: 2021-12-01
Attending: EMERGENCY MEDICINE | Admitting: EMERGENCY MEDICINE

## 2021-12-01 ENCOUNTER — APPOINTMENT (EMERGENCY)
Dept: RADIOLOGY | Facility: HOSPITAL | Age: 56
End: 2021-12-01

## 2021-12-01 VITALS
OXYGEN SATURATION: 98 % | TEMPERATURE: 97.7 F | BODY MASS INDEX: 19.26 KG/M2 | DIASTOLIC BLOOD PRESSURE: 77 MMHG | RESPIRATION RATE: 14 BRPM | HEART RATE: 60 BPM | WEIGHT: 130 LBS | HEIGHT: 69 IN | SYSTOLIC BLOOD PRESSURE: 122 MMHG

## 2021-12-01 DIAGNOSIS — R42 EPISODE OF DIZZINESS: Primary | ICD-10-CM

## 2021-12-01 LAB
2HR DELTA HS TROPONIN: -3 NG/L
ALBUMIN SERPL BCP-MCNC: 3.6 G/DL (ref 3.5–5)
ALP SERPL-CCNC: 59 U/L (ref 46–116)
ALT SERPL W P-5'-P-CCNC: 47 U/L (ref 12–78)
ANION GAP SERPL CALCULATED.3IONS-SCNC: 11 MMOL/L (ref 4–13)
AST SERPL W P-5'-P-CCNC: 68 U/L (ref 5–45)
ATRIAL RATE: 70 BPM
ATRIAL RATE: 72 BPM
BASOPHILS # BLD AUTO: 0.12 THOUSANDS/ΜL (ref 0–0.1)
BASOPHILS NFR BLD AUTO: 2 % (ref 0–1)
BILIRUB DIRECT SERPL-MCNC: 0.09 MG/DL (ref 0–0.2)
BILIRUB SERPL-MCNC: 0.48 MG/DL (ref 0.2–1)
BUN SERPL-MCNC: 12 MG/DL (ref 5–25)
CALCIUM SERPL-MCNC: 8.7 MG/DL (ref 8.3–10.1)
CARDIAC TROPONIN I PNL SERPL HS: 4 NG/L
CARDIAC TROPONIN I PNL SERPL HS: 7 NG/L
CHLORIDE SERPL-SCNC: 110 MMOL/L (ref 100–108)
CO2 SERPL-SCNC: 28 MMOL/L (ref 21–32)
CREAT SERPL-MCNC: 0.66 MG/DL (ref 0.6–1.3)
EOSINOPHIL # BLD AUTO: 0.28 THOUSAND/ΜL (ref 0–0.61)
EOSINOPHIL NFR BLD AUTO: 5 % (ref 0–6)
ERYTHROCYTE [DISTWIDTH] IN BLOOD BY AUTOMATED COUNT: 14 % (ref 11.6–15.1)
GFR SERPL CREATININE-BSD FRML MDRD: 110 ML/MIN/1.73SQ M
GLUCOSE SERPL-MCNC: 96 MG/DL (ref 65–140)
HCT VFR BLD AUTO: 38.8 % (ref 36.5–49.3)
HGB BLD-MCNC: 13.1 G/DL (ref 12–17)
IMM GRANULOCYTES # BLD AUTO: 0.01 THOUSAND/UL (ref 0–0.2)
IMM GRANULOCYTES NFR BLD AUTO: 0 % (ref 0–2)
LIPASE SERPL-CCNC: 180 U/L (ref 73–393)
LYMPHOCYTES # BLD AUTO: 1.82 THOUSANDS/ΜL (ref 0.6–4.47)
LYMPHOCYTES NFR BLD AUTO: 31 % (ref 14–44)
MAGNESIUM SERPL-MCNC: 2 MG/DL (ref 1.6–2.6)
MCH RBC QN AUTO: 36.3 PG (ref 26.8–34.3)
MCHC RBC AUTO-ENTMCNC: 33.8 G/DL (ref 31.4–37.4)
MCV RBC AUTO: 108 FL (ref 82–98)
MONOCYTES # BLD AUTO: 0.71 THOUSAND/ΜL (ref 0.17–1.22)
MONOCYTES NFR BLD AUTO: 12 % (ref 4–12)
NEUTROPHILS # BLD AUTO: 2.98 THOUSANDS/ΜL (ref 1.85–7.62)
NEUTS SEG NFR BLD AUTO: 50 % (ref 43–75)
NRBC BLD AUTO-RTO: 0 /100 WBCS
P AXIS: 73 DEGREES
P AXIS: 79 DEGREES
PLATELET # BLD AUTO: 160 THOUSANDS/UL (ref 149–390)
PMV BLD AUTO: 10.5 FL (ref 8.9–12.7)
POTASSIUM SERPL-SCNC: 4 MMOL/L (ref 3.5–5.3)
PR INTERVAL: 168 MS
PR INTERVAL: 172 MS
PROT SERPL-MCNC: 6.9 G/DL (ref 6.4–8.2)
QRS AXIS: 84 DEGREES
QRS AXIS: 86 DEGREES
QRSD INTERVAL: 102 MS
QRSD INTERVAL: 104 MS
QT INTERVAL: 432 MS
QT INTERVAL: 458 MS
QTC INTERVAL: 473 MS
QTC INTERVAL: 494 MS
RBC # BLD AUTO: 3.61 MILLION/UL (ref 3.88–5.62)
SODIUM SERPL-SCNC: 149 MMOL/L (ref 136–145)
T WAVE AXIS: 52 DEGREES
T WAVE AXIS: 64 DEGREES
VENTRICULAR RATE: 70 BPM
VENTRICULAR RATE: 72 BPM
WBC # BLD AUTO: 5.92 THOUSAND/UL (ref 4.31–10.16)

## 2021-12-01 PROCEDURE — 85025 COMPLETE CBC W/AUTO DIFF WBC: CPT | Performed by: EMERGENCY MEDICINE

## 2021-12-01 PROCEDURE — 99284 EMERGENCY DEPT VISIT MOD MDM: CPT | Performed by: EMERGENCY MEDICINE

## 2021-12-01 PROCEDURE — 83690 ASSAY OF LIPASE: CPT | Performed by: EMERGENCY MEDICINE

## 2021-12-01 PROCEDURE — 80076 HEPATIC FUNCTION PANEL: CPT | Performed by: EMERGENCY MEDICINE

## 2021-12-01 PROCEDURE — 93010 ELECTROCARDIOGRAM REPORT: CPT | Performed by: INTERNAL MEDICINE

## 2021-12-01 PROCEDURE — 96366 THER/PROPH/DIAG IV INF ADDON: CPT

## 2021-12-01 PROCEDURE — 71045 X-RAY EXAM CHEST 1 VIEW: CPT

## 2021-12-01 PROCEDURE — 96360 HYDRATION IV INFUSION INIT: CPT

## 2021-12-01 PROCEDURE — 93005 ELECTROCARDIOGRAM TRACING: CPT

## 2021-12-01 PROCEDURE — 96368 THER/DIAG CONCURRENT INF: CPT

## 2021-12-01 PROCEDURE — 99284 EMERGENCY DEPT VISIT MOD MDM: CPT

## 2021-12-01 PROCEDURE — 96365 THER/PROPH/DIAG IV INF INIT: CPT

## 2021-12-01 PROCEDURE — 83735 ASSAY OF MAGNESIUM: CPT | Performed by: EMERGENCY MEDICINE

## 2021-12-01 PROCEDURE — 80048 BASIC METABOLIC PNL TOTAL CA: CPT | Performed by: EMERGENCY MEDICINE

## 2021-12-01 PROCEDURE — 36415 COLL VENOUS BLD VENIPUNCTURE: CPT | Performed by: EMERGENCY MEDICINE

## 2021-12-01 PROCEDURE — 84484 ASSAY OF TROPONIN QUANT: CPT | Performed by: EMERGENCY MEDICINE

## 2021-12-01 RX ADMIN — FOLIC ACID 1 MG: 5 INJECTION, SOLUTION INTRAMUSCULAR; INTRAVENOUS; SUBCUTANEOUS at 08:03

## 2021-12-01 RX ADMIN — THIAMINE HYDROCHLORIDE 100 MG: 100 INJECTION, SOLUTION INTRAMUSCULAR; INTRAVENOUS at 08:03

## 2021-12-01 RX ADMIN — SODIUM CHLORIDE 1000 ML: 0.9 INJECTION, SOLUTION INTRAVENOUS at 07:22

## 2022-05-30 ENCOUNTER — HOSPITAL ENCOUNTER (EMERGENCY)
Facility: HOSPITAL | Age: 57
Discharge: HOME | End: 2022-05-30
Attending: EMERGENCY MEDICINE | Admitting: EMERGENCY MEDICINE
Payer: COMMERCIAL

## 2022-05-30 ENCOUNTER — APPOINTMENT (EMERGENCY)
Dept: RADIOLOGY | Facility: HOSPITAL | Age: 57
End: 2022-05-30
Payer: COMMERCIAL

## 2022-05-30 VITALS
OXYGEN SATURATION: 97 % | TEMPERATURE: 98.3 F | DIASTOLIC BLOOD PRESSURE: 57 MMHG | WEIGHT: 130 LBS | HEART RATE: 82 BPM | RESPIRATION RATE: 17 BRPM | BODY MASS INDEX: 19.26 KG/M2 | HEIGHT: 69 IN | SYSTOLIC BLOOD PRESSURE: 102 MMHG

## 2022-05-30 DIAGNOSIS — J40 BRONCHITIS: Primary | ICD-10-CM

## 2022-05-30 LAB
2HR DELTA HS TROPONIN: 0 NG/L
ALBUMIN SERPL BCP-MCNC: 3.3 G/DL (ref 3.5–5)
ALP SERPL-CCNC: 88 U/L (ref 46–116)
ALT SERPL W P-5'-P-CCNC: 76 U/L (ref 12–78)
ANION GAP SERPL CALCULATED.3IONS-SCNC: 17 MMOL/L (ref 4–13)
AST SERPL W P-5'-P-CCNC: 137 U/L (ref 5–45)
BASOPHILS # BLD AUTO: 0.11 THOUSANDS/ΜL (ref 0–0.1)
BASOPHILS NFR BLD AUTO: 2 % (ref 0–1)
BILIRUB SERPL-MCNC: 0.8 MG/DL (ref 0.2–1)
BUN SERPL-MCNC: 16 MG/DL (ref 5–25)
CALCIUM ALBUM COR SERPL-MCNC: 9.5 MG/DL (ref 8.3–10.1)
CALCIUM SERPL-MCNC: 8.9 MG/DL (ref 8.3–10.1)
CARDIAC TROPONIN I PNL SERPL HS: 7 NG/L
CARDIAC TROPONIN I PNL SERPL HS: 7 NG/L
CHLORIDE SERPL-SCNC: 99 MMOL/L (ref 100–108)
CO2 SERPL-SCNC: 26 MMOL/L (ref 21–32)
CREAT SERPL-MCNC: 1.01 MG/DL (ref 0.6–1.3)
EOSINOPHIL # BLD AUTO: 0.19 THOUSAND/ΜL (ref 0–0.61)
EOSINOPHIL NFR BLD AUTO: 3 % (ref 0–6)
ERYTHROCYTE [DISTWIDTH] IN BLOOD BY AUTOMATED COUNT: 13.8 % (ref 11.6–15.1)
GFR SERPL CREATININE-BSD FRML MDRD: 83 ML/MIN/1.73SQ M
GLUCOSE SERPL-MCNC: 145 MG/DL (ref 65–140)
HCT VFR BLD AUTO: 36.1 % (ref 36.5–49.3)
HGB BLD-MCNC: 12.8 G/DL (ref 12–17)
IMM GRANULOCYTES # BLD AUTO: 0.05 THOUSAND/UL (ref 0–0.2)
IMM GRANULOCYTES NFR BLD AUTO: 1 % (ref 0–2)
LYMPHOCYTES # BLD AUTO: 1.73 THOUSANDS/ΜL (ref 0.6–4.47)
LYMPHOCYTES NFR BLD AUTO: 27 % (ref 14–44)
MCH RBC QN AUTO: 38.6 PG (ref 26.8–34.3)
MCHC RBC AUTO-ENTMCNC: 35.5 G/DL (ref 31.4–37.4)
MCV RBC AUTO: 109 FL (ref 82–98)
MONOCYTES # BLD AUTO: 0.77 THOUSAND/ΜL (ref 0.17–1.22)
MONOCYTES NFR BLD AUTO: 12 % (ref 4–12)
NEUTROPHILS # BLD AUTO: 3.64 THOUSANDS/ΜL (ref 1.85–7.62)
NEUTS SEG NFR BLD AUTO: 55 % (ref 43–75)
NRBC BLD AUTO-RTO: 0 /100 WBCS
NT-PROBNP SERPL-MCNC: 39 PG/ML
PLATELET # BLD AUTO: 282 THOUSANDS/UL (ref 149–390)
PMV BLD AUTO: 9.7 FL (ref 8.9–12.7)
POTASSIUM SERPL-SCNC: 3.2 MMOL/L (ref 3.5–5.3)
PROT SERPL-MCNC: 7.2 G/DL (ref 6.4–8.2)
RBC # BLD AUTO: 3.32 MILLION/UL (ref 3.88–5.62)
SODIUM SERPL-SCNC: 142 MMOL/L (ref 136–145)
WBC # BLD AUTO: 6.49 THOUSAND/UL (ref 4.31–10.16)

## 2022-05-30 PROCEDURE — 99284 EMERGENCY DEPT VISIT MOD MDM: CPT

## 2022-05-30 PROCEDURE — 84484 ASSAY OF TROPONIN QUANT: CPT | Performed by: EMERGENCY MEDICINE

## 2022-05-30 PROCEDURE — 85025 COMPLETE CBC W/AUTO DIFF WBC: CPT | Performed by: EMERGENCY MEDICINE

## 2022-05-30 PROCEDURE — 80053 COMPREHEN METABOLIC PANEL: CPT | Performed by: EMERGENCY MEDICINE

## 2022-05-30 PROCEDURE — 36415 COLL VENOUS BLD VENIPUNCTURE: CPT | Performed by: EMERGENCY MEDICINE

## 2022-05-30 PROCEDURE — 93005 ELECTROCARDIOGRAM TRACING: CPT

## 2022-05-30 PROCEDURE — 99284 EMERGENCY DEPT VISIT MOD MDM: CPT | Performed by: EMERGENCY MEDICINE

## 2022-05-30 PROCEDURE — 83880 ASSAY OF NATRIURETIC PEPTIDE: CPT | Performed by: EMERGENCY MEDICINE

## 2022-05-30 PROCEDURE — 71045 X-RAY EXAM CHEST 1 VIEW: CPT

## 2022-05-30 PROCEDURE — 94640 AIRWAY INHALATION TREATMENT: CPT

## 2022-05-30 RX ORDER — PREDNISONE 20 MG/1
40 TABLET ORAL DAILY
Qty: 10 TABLET | Refills: 0 | Status: SHIPPED | OUTPATIENT
Start: 2022-05-30 | End: 2022-06-04

## 2022-05-30 RX ORDER — ALBUTEROL SULFATE 90 UG/1
2 AEROSOL, METERED RESPIRATORY (INHALATION) ONCE
Status: COMPLETED | OUTPATIENT
Start: 2022-05-30 | End: 2022-05-30

## 2022-05-30 RX ORDER — POTASSIUM CHLORIDE 20 MEQ/1
40 TABLET, EXTENDED RELEASE ORAL ONCE
Status: COMPLETED | OUTPATIENT
Start: 2022-05-30 | End: 2022-05-30

## 2022-05-30 RX ORDER — ALBUTEROL SULFATE 2.5 MG/3ML
5 SOLUTION RESPIRATORY (INHALATION) ONCE
Status: COMPLETED | OUTPATIENT
Start: 2022-05-30 | End: 2022-05-30

## 2022-05-30 RX ORDER — PREDNISONE 20 MG/1
40 TABLET ORAL ONCE
Status: COMPLETED | OUTPATIENT
Start: 2022-05-30 | End: 2022-05-30

## 2022-05-30 RX ADMIN — ALBUTEROL SULFATE 2 PUFF: 90 AEROSOL, METERED RESPIRATORY (INHALATION) at 22:47

## 2022-05-30 RX ADMIN — ALBUTEROL SULFATE 5 MG: 2.5 SOLUTION RESPIRATORY (INHALATION) at 21:22

## 2022-05-30 RX ADMIN — POTASSIUM CHLORIDE 40 MEQ: 1500 TABLET, EXTENDED RELEASE ORAL at 21:22

## 2022-05-30 RX ADMIN — IPRATROPIUM BROMIDE 0.5 MG: 0.5 SOLUTION RESPIRATORY (INHALATION) at 21:23

## 2022-05-30 RX ADMIN — PREDNISONE 40 MG: 20 TABLET ORAL at 21:22

## 2022-05-31 NOTE — ED NOTES
Ambulatory pulse ox 92%, denies any dizziness or sob  Tarry Junie Gonsales, [facility]  05/30/22 8222

## 2022-06-01 NOTE — ED PROVIDER NOTES
History  Chief Complaint   Patient presents with    Cough     Pt complains of cough with SOB and feeling of heart pounding for couple days  68-year-old male presenting to the emergency department for evaluation of cough  Patient has had cough and shortness of breath as well as feelings palpitations over the past 1-2 days  Denies chest pain otherwise, denies lightheadedness syncope or presyncope  Denies any fevers or chills  Cough is mild and nonproductive  None       Past Medical History:   Diagnosis Date    Liver failure ([address] )        History reviewed  No pertinent surgical history  Family History   Problem Relation Age of Onset    Hypertension Mother     Cancer Mother     Hypertension Father     Aortic aneurysm Father     Heart failure Father      I have reviewed and agree with the history as documented  E-Cigarette/Vaping     E-Cigarette/Vaping Substances     Social History     Tobacco Use    Smoking status: Current Every Day Smoker     Packs/day: 2 00     Years: 40 00     Pack years: 80 00     Types: Cigarettes    Smokeless tobacco: Never Used   Substance Use Topics    Alcohol use: Yes     Comment: 1/2 5th of vodka daily    Drug use: Not Currently       Review of Systems   Constitutional: Negative for appetite change, chills, fatigue and fever  HENT: Negative for sneezing and sore throat  Eyes: Negative for visual disturbance  Respiratory: Positive for cough and chest tightness  Negative for choking, shortness of breath and wheezing  Cardiovascular: Positive for palpitations  Negative for chest pain  Gastrointestinal: Negative for abdominal pain, constipation, diarrhea, nausea and vomiting  Genitourinary: Negative for difficulty urinating and dysuria  Neurological: Negative for dizziness, weakness, light-headedness, numbness and headaches  All other systems reviewed and are negative        Physical Exam  Physical Exam  Constitutional:       General: He is not in acute distress  Appearance: He is well-developed  He is not diaphoretic  HENT:      Head: Normocephalic and atraumatic  Eyes:      Pupils: Pupils are equal, round, and reactive to light  Neck:      Vascular: No JVD  Trachea: No tracheal deviation  Cardiovascular:      Rate and Rhythm: Normal rate and regular rhythm  Heart sounds: Normal heart sounds  No murmur heard  No friction rub  No gallop  Pulmonary:      Effort: Pulmonary effort is normal  No respiratory distress  Breath sounds: Wheezing present  No rales  Abdominal:      General: Bowel sounds are normal  There is no distension  Palpations: Abdomen is soft  Tenderness: There is no abdominal tenderness  There is no guarding or rebound  Skin:     General: Skin is warm and dry  Coloration: Skin is not pale  Neurological:      Mental Status: He is alert and oriented to person, place, and time  Cranial Nerves: No cranial nerve deficit  Motor: No abnormal muscle tone     Psychiatric:         Behavior: Behavior normal          Vital Signs  ED Triage Vitals [05/30/22 1938]   Temperature Pulse Respirations Blood Pressure SpO2   98 3 °F (36 8 °C) 104 18 113/71 96 %      Temp Source Heart Rate Source Patient Position - Orthostatic VS BP Location FiO2 (%)   Oral Monitor Sitting Left arm --      Pain Score       --           Vitals:    05/30/22 1945 05/30/22 2000 05/30/22 2030 05/30/22 2200   BP: 111/65 109/61 102/58 102/57   Pulse: 94 85 80 82   Patient Position - Orthostatic VS: Sitting Sitting Sitting Sitting         Visual Acuity      ED Medications  Medications   albuterol inhalation solution 5 mg (5 mg Nebulization Given 5/30/22 2122)   ipratropium (ATROVENT) 0 02 % inhalation solution 0 5 mg (0 5 mg Nebulization Given 5/30/22 2123)   predniSONE tablet 40 mg (40 mg Oral Given 5/30/22 2122)   potassium chloride (K-DUR,KLOR-CON) CR tablet 40 mEq (40 mEq Oral Given 5/30/22 2122)   albuterol (PROVENTIL HFA,VENTOLIN HFA) inhaler 2 puff (2 puffs Inhalation Given 5/30/22 2247)       Diagnostic Studies  Results Reviewed     Procedure Component Value Units Date/Time    HS Troponin I 2hr [[id number]]  (Normal) Collected: 05/30/22 2226    Lab Status: Final result Specimen: Blood from Arm, Right Updated: 05/30/22 2255     hs TnI 2hr 7 ng/L      Delta 2hr hsTnI 0 ng/L     NT-BNP PRO [[id number]]  (Normal) Collected: 05/30/22 2021    Lab Status: Final result Specimen: Blood from Arm, Right Updated: 05/30/22 2053     NT-proBNP 39 pg/mL     Comprehensive metabolic panel [[id number]]  (Abnormal) Collected: 05/30/22 2021    Lab Status: Final result Specimen: Blood from Arm, Right Updated: 05/30/22 2052     Sodium 142 mmol/L      Potassium 3 2 mmol/L      Chloride 99 mmol/L      CO2 26 mmol/L      ANION GAP 17 mmol/L      BUN 16 mg/dL      Creatinine 1 01 mg/dL      Glucose 145 mg/dL      Calcium 8 9 mg/dL      Corrected Calcium 9 5 mg/dL       U/L      ALT 76 U/L      Alkaline Phosphatase 88 U/L      Total Protein 7 2 g/dL      Albumin 3 3 g/dL      Total Bilirubin 0 80 mg/dL      eGFR 83 ml/min/1 73sq m     Narrative:      [place] guidelines for Chronic Kidney Disease (CKD):     Stage 1 with normal or high GFR (GFR > 90 mL/min/1 73 square meters)    Stage 2 Mild CKD (GFR = 60-89 mL/min/1 73 square meters)    Stage 3A Moderate CKD (GFR = 45-59 mL/min/1 73 square meters)    Stage 3B Moderate CKD (GFR = 30-44 mL/min/1 73 square meters)    Stage 4 Severe CKD (GFR = 15-29 mL/min/1 73 square meters)    Stage 5 End Stage CKD (GFR <15 mL/min/1 73 square meters)  Note: GFR calculation is accurate only with a steady state creatinine    HS Troponin 0hr (reflex protocol) [[id number]]  (Normal) Collected: 05/30/22 2021    Lab Status: Final result Specimen: Blood from Arm, Right Updated: 05/30/22 2052     hs TnI 0hr 7 ng/L     CBC and differential [[id number]]  (Abnormal) Collected: 05/30/22 2021    Lab Status: Final result Specimen: Blood from Arm, Right Updated: 05/30/22 2028     WBC 6 49 Thousand/uL      RBC 3 32 Million/uL      Hemoglobin 12 8 g/dL      Hematocrit 36 1 %       fL      MCH 38 6 pg      MCHC 35 5 g/dL      RDW 13 8 %      MPV 9 7 fL      Platelets 654 Thousands/uL      nRBC 0 /100 WBCs      Neutrophils Relative 55 %      Immat GRANS % 1 %      Lymphocytes Relative 27 %      Monocytes Relative 12 %      Eosinophils Relative 3 %      Basophils Relative 2 %      Neutrophils Absolute 3 64 Thousands/µL      Immature Grans Absolute 0 05 Thousand/uL      Lymphocytes Absolute 1 73 Thousands/µL      Monocytes Absolute 0 77 Thousand/µL      Eosinophils Absolute 0 19 Thousand/µL      Basophils Absolute 0 11 Thousands/µL                  XR chest 1 view portable   Final Result by Gail Swartz MD (05/31 7224)      No acute cardiopulmonary disease  Workstation performed: [id number]                    Procedures  Procedures         ED Course                                             MDM  Number of Diagnoses or Management Options  Bronchitis  Diagnosis management comments: 51-year-old male with cough and chest tightness, mild bronchospasm on exam, will give nebulizer, steroid, check cardiac labs, reassess  Disposition  Final diagnoses:   Bronchitis     Time reflects when diagnosis was documented in both MDM as applicable and the Disposition within this note     Time User Action Codes Description Comment    5/30/2022 10:37 PM Jessica Patel Monancy Bronchitis       ED Disposition     ED Disposition   Discharge    Condition   Stable    Date/Time   Mon May 30, 2022 10:36 PM    Comment   [place] discharge to home/self care                 Follow-up Information     Follow up With Specialties Details [address]    [phone]            Discharge Medication List as of 5/30/2022 10:37 PM      START taking these medications    Details   predniSONE 20 mg tablet Take 2 tablets (40 mg total) by mouth daily for 5 days, Starting Mon 5/30/2022, Until Sat 6/4/2022, Normal             No discharge procedures on file      PDMP Review       Value Time User    PDMP Reviewed  Yes 3/9/2020  4:47 PM Gume Blount MD          ED Provider  Electronically Signed by           Magdalena Hill MD  05/31/22 5429

## 2022-06-03 LAB
ATRIAL RATE: 97 BPM
P AXIS: 82 DEGREES
PR INTERVAL: 156 MS
QRS AXIS: 86 DEGREES
QRSD INTERVAL: 98 MS
QT INTERVAL: 378 MS
QTC INTERVAL: 480 MS
T WAVE AXIS: -79 DEGREES
VENTRICULAR RATE: 97 BPM

## 2022-06-03 PROCEDURE — 93010 ELECTROCARDIOGRAM REPORT: CPT | Performed by: INTERNAL MEDICINE

## 2023-01-23 ENCOUNTER — HOSPITAL ENCOUNTER (EMERGENCY)
Facility: HOSPITAL | Age: 58
Discharge: HOME | End: 2023-01-23
Attending: EMERGENCY MEDICINE

## 2023-01-23 ENCOUNTER — APPOINTMENT (EMERGENCY)
Dept: CT IMAGING | Facility: HOSPITAL | Age: 58
End: 2023-01-23

## 2023-01-23 VITALS
OXYGEN SATURATION: 99 % | HEART RATE: 51 BPM | TEMPERATURE: 97.6 F | SYSTOLIC BLOOD PRESSURE: 109 MMHG | RESPIRATION RATE: 19 BRPM | DIASTOLIC BLOOD PRESSURE: 64 MMHG

## 2023-01-23 DIAGNOSIS — R56.9 SEIZURE (HCC): Primary | ICD-10-CM

## 2023-01-23 LAB
2HR DELTA HS TROPONIN: 2 NG/L
ALBUMIN SERPL BCP-MCNC: 3.7 G/DL (ref 3.5–5)
ALP SERPL-CCNC: 64 U/L (ref 46–116)
ALT SERPL W P-5'-P-CCNC: 21 U/L (ref 12–78)
ANION GAP SERPL CALCULATED.3IONS-SCNC: 10 MMOL/L (ref 4–13)
AST SERPL W P-5'-P-CCNC: 24 U/L (ref 5–45)
BASOPHILS # BLD AUTO: 0.09 THOUSANDS/ÂΜL (ref 0–0.1)
BASOPHILS NFR BLD AUTO: 1 % (ref 0–1)
BILIRUB SERPL-MCNC: 1.22 MG/DL (ref 0.2–1)
BUN SERPL-MCNC: 22 MG/DL (ref 5–25)
CALCIUM SERPL-MCNC: 8.8 MG/DL (ref 8.3–10.1)
CARDIAC TROPONIN I PNL SERPL HS: 4 NG/L
CARDIAC TROPONIN I PNL SERPL HS: 6 NG/L
CHLORIDE SERPL-SCNC: 102 MMOL/L (ref 96–108)
CO2 SERPL-SCNC: 25 MMOL/L (ref 21–32)
CREAT SERPL-MCNC: 0.91 MG/DL (ref 0.6–1.3)
EOSINOPHIL # BLD AUTO: 0.09 THOUSAND/ÂΜL (ref 0–0.61)
EOSINOPHIL NFR BLD AUTO: 1 % (ref 0–6)
ERYTHROCYTE [DISTWIDTH] IN BLOOD BY AUTOMATED COUNT: 14.5 % (ref 11.6–15.1)
GFR SERPL CREATININE-BSD FRML MDRD: 94 ML/MIN/1.73SQ M
GLUCOSE SERPL-MCNC: 132 MG/DL (ref 65–140)
HCT VFR BLD AUTO: 37.3 % (ref 36.5–49.3)
HGB BLD-MCNC: 12.7 G/DL (ref 12–17)
IMM GRANULOCYTES # BLD AUTO: 0.03 THOUSAND/UL (ref 0–0.2)
IMM GRANULOCYTES NFR BLD AUTO: 0 % (ref 0–2)
LYMPHOCYTES # BLD AUTO: 1.75 THOUSANDS/ÂΜL (ref 0.6–4.47)
LYMPHOCYTES NFR BLD AUTO: 21 % (ref 14–44)
MAGNESIUM SERPL-MCNC: 1.8 MG/DL (ref 1.6–2.6)
MCH RBC QN AUTO: 35.4 PG (ref 26.8–34.3)
MCHC RBC AUTO-ENTMCNC: 34 G/DL (ref 31.4–37.4)
MCV RBC AUTO: 104 FL (ref 82–98)
MONOCYTES # BLD AUTO: 0.94 THOUSAND/ÂΜL (ref 0.17–1.22)
MONOCYTES NFR BLD AUTO: 12 % (ref 4–12)
NEUTROPHILS # BLD AUTO: 5.26 THOUSANDS/ÂΜL (ref 1.85–7.62)
NEUTS SEG NFR BLD AUTO: 65 % (ref 43–75)
NRBC BLD AUTO-RTO: 0 /100 WBCS
PLATELET # BLD AUTO: 209 THOUSANDS/UL (ref 149–390)
PMV BLD AUTO: 10.1 FL (ref 8.9–12.7)
POTASSIUM SERPL-SCNC: 4.1 MMOL/L (ref 3.5–5.3)
PROT SERPL-MCNC: 7 G/DL (ref 6.4–8.4)
RBC # BLD AUTO: 3.59 MILLION/UL (ref 3.88–5.62)
SODIUM SERPL-SCNC: 137 MMOL/L (ref 135–147)
WBC # BLD AUTO: 8.16 THOUSAND/UL (ref 4.31–10.16)

## 2023-01-23 RX ORDER — DIAZEPAM 5 MG/1
5 TABLET ORAL ONCE
Status: COMPLETED | OUTPATIENT
Start: 2023-01-23 | End: 2023-01-23

## 2023-01-23 RX ADMIN — DIAZEPAM 5 MG: 5 TABLET ORAL at 15:41

## 2023-01-23 NOTE — ED PROVIDER NOTES
History  Chief Complaint   Patient presents with   • Seizure - Prior Hx Of     Patient BIB EMS from work due to seizure like activity  According to staff, pt slowly collapsed to the floor and started shaking  EMS reports post dictal confusion upon their arrival  Seizure lasted approximately 3-5 mins  Pt has swelling to his R forehead  (-) Blood thinners  GCS 14  Pt reports feeling lightheaded prior to seizure episode  Hx Seizure approximately a year ago  Pt does not taking any medication for seizure and has not followed up with Neurology  This is a 65-year-old male patient presenting for evaluation of possible seizure  Witnessed by coworkers  States he began to feel lightheaded and lowered himself to the ground  He then had a seizure lasting approximately 3 to 5 minutes and was generalized  He was unresponsive during this time  Afterwards he seemed postictal as per EMS, now appears back to baseline  He offers no complaints at this time  He states he had a similar seizure about 1 to 2 months ago but was never evaluated for it  He notes history of alcohol abuse, states his last drink was approximately 1 week ago  History provided by:  Patient   used: No    Seizure - Prior Hx Of  Seizure activity on arrival: no    Seizure type:  Grand mal  Initial focality:  None      None       Past Medical History:   Diagnosis Date   • Liver failure ([facility] [address] )        History reviewed  No pertinent surgical history  Family History   Problem Relation Age of Onset   • Hypertension Mother    • Cancer Mother    • Hypertension Father    • Aortic aneurysm Father    • Heart failure Father      I have reviewed and agree with the history as documented      E-Cigarette/Vaping     E-Cigarette/Vaping Substances     Social History     Tobacco Use   • Smoking status: Every Day     Packs/day: 2 00     Years: 40 00     Pack years: 80 00     Types: Cigarettes   • Smokeless tobacco: Never   Substance Use Topics   • Alcohol use: Yes     Comment: 1/2 5th of vodka daily   • Drug use: Not Currently       Review of Systems   Constitutional: Negative for chills and fever  HENT: Negative for ear pain and sore throat  Eyes: Negative for pain and visual disturbance  Respiratory: Negative for cough and shortness of breath  Cardiovascular: Negative for chest pain and palpitations  Gastrointestinal: Negative for abdominal pain and vomiting  Genitourinary: Negative for dysuria and hematuria  Musculoskeletal: Negative for arthralgias and back pain  Skin: Negative for color change and rash  Neurological: Positive for light-headedness  Negative for seizures and syncope  All other systems reviewed and are negative  Physical Exam  Physical Exam  Vitals and nursing note reviewed  Constitutional:       General: He is not in acute distress  Appearance: He is well-developed  HENT:      Head: Normocephalic and atraumatic  Eyes:      Conjunctiva/sclera: Conjunctivae normal    Cardiovascular:      Rate and Rhythm: Normal rate and regular rhythm  Heart sounds: No murmur heard  Pulmonary:      Effort: Pulmonary effort is normal  No respiratory distress  Breath sounds: Normal breath sounds  Abdominal:      Palpations: Abdomen is soft  Tenderness: There is no abdominal tenderness  Musculoskeletal:         General: No swelling  Cervical back: Neck supple  Skin:     General: Skin is warm and dry  Capillary Refill: Capillary refill takes less than 2 seconds  Neurological:      Mental Status: He is alert and oriented to person, place, and time  GCS: GCS eye subscore is 4  GCS verbal subscore is 5  GCS motor subscore is 6  Comments: GCS 15  AAOx3  No focal neuro deficits  CN II-XII grossly intact  Speech normal, no aphasia or dysarthria  No pronator drift  Cerebellar function normal  Finger to nose normal  PERRL  EOMI  Peripheral vision intact  No nystagmus   Upper and lower extremity strength 5/5 through   strength 5/5 b/l  Gross sensation intact b/l        Psychiatric:         Mood and Affect: Mood normal          Vital Signs  ED Triage Vitals   Temperature Pulse Respirations Blood Pressure SpO2   01/23/23 1600 01/23/23 1521 01/23/23 1521 01/23/23 1521 01/23/23 1517   97 6 °F (36 4 °C) 64 18 144/73 100 %      Temp Source Heart Rate Source Patient Position - Orthostatic VS BP Location FiO2 (%)   01/23/23 1600 01/23/23 1521 01/23/23 1521 01/23/23 1521 --   Oral Monitor Lying Right arm       Pain Score       01/23/23 1521       No Pain           Vitals:    01/23/23 1521 01/23/23 1600 01/23/23 1730 01/23/23 1800   BP: 144/73 121/65 118/58 109/64   Pulse: 64 59 (!) 53 (!) 51   Patient Position - Orthostatic VS: Lying Lying Lying Lying         Visual Acuity  Visual Acuity    Flowsheet Row Most Recent Value   L Pupil Size (mm) 3   R Pupil Size (mm) 3   L Pupil Shape Round   R Pupil Shape Round          ED Medications  Medications   diazepam (VALIUM) tablet 5 mg (5 mg Oral Given 1/23/23 1541)       Diagnostic Studies  Results Reviewed     Procedure Component Value Units Date/Time    HS Troponin I 2hr [[id number]]  (Normal) Collected: 01/23/23 1741    Lab Status: Final result Specimen: Blood from Arm, Left Updated: 01/23/23 1811     hs TnI 2hr 6 ng/L      Delta 2hr hsTnI 2 ng/L     HS Troponin I 4hr [[id number]]     Lab Status: No result Specimen: Blood     HS Troponin 0hr (reflex protocol) [[id number]]  (Normal) Collected: 01/23/23 1531    Lab Status: Final result Specimen: Blood from Arm, Left Updated: 01/23/23 1621     hs TnI 0hr 4 ng/L     Comprehensive metabolic panel [[id number]]  (Abnormal) Collected: 01/23/23 1531    Lab Status: Final result Specimen: Blood from Arm, Left Updated: 01/23/23 1611     Sodium 137 mmol/L      Potassium 4 1 mmol/L      Chloride 102 mmol/L      CO2 25 mmol/L      ANION GAP 10 mmol/L      BUN 22 mg/dL      Creatinine 0 91 mg/dL      Glucose 132 mg/dL Calcium 8 8 mg/dL      AST 24 U/L      ALT 21 U/L      Alkaline Phosphatase 64 U/L      Total Protein 7 0 g/dL      Albumin 3 7 g/dL      Total Bilirubin 1 22 mg/dL      eGFR 94 ml/min/1 73sq m     Narrative:      [place] guidelines for Chronic Kidney Disease (CKD):   •  Stage 1 with normal or high GFR (GFR > 90 mL/min/1 73 square meters)  •  Stage 2 Mild CKD (GFR = 60-89 mL/min/1 73 square meters)  •  Stage 3A Moderate CKD (GFR = 45-59 mL/min/1 73 square meters)  •  Stage 3B Moderate CKD (GFR = 30-44 mL/min/1 73 square meters)  •  Stage 4 Severe CKD (GFR = 15-29 mL/min/1 73 square meters)  •  Stage 5 End Stage CKD (GFR <15 mL/min/1 73 square meters)  Note: GFR calculation is accurate only with a steady state creatinine    Magnesium [[id number]]  (Normal) Collected: 01/23/23 1531    Lab Status: Final result Specimen: Blood from Arm, Left Updated: 01/23/23 1611     Magnesium 1 8 mg/dL     CBC and differential [[id number]]  (Abnormal) Collected: 01/23/23 1531    Lab Status: Final result Specimen: Blood from Arm, Left Updated: 01/23/23 1539     WBC 8 16 Thousand/uL      RBC 3 59 Million/uL      Hemoglobin 12 7 g/dL      Hematocrit 37 3 %       fL      MCH 35 4 pg      MCHC 34 0 g/dL      RDW 14 5 %      MPV 10 1 fL      Platelets 940 Thousands/uL      nRBC 0 /100 WBCs      Neutrophils Relative 65 %      Immat GRANS % 0 %      Lymphocytes Relative 21 %      Monocytes Relative 12 %      Eosinophils Relative 1 %      Basophils Relative 1 %      Neutrophils Absolute 5 26 Thousands/µL      Immature Grans Absolute 0 03 Thousand/uL      Lymphocytes Absolute 1 75 Thousands/µL      Monocytes Absolute 0 94 Thousand/µL      Eosinophils Absolute 0 09 Thousand/µL      Basophils Absolute 0 09 Thousands/µL                  CT head without contrast   Final Result by Keyona Cordova DO (01/23 1603)      No acute intracranial abnormality  Chronic microangiopathic changes    Abnormal soft tissue bilateral external auditory canal potentially represent cerumen  Direct visualization recommended  Workstation performed: [id number]                    Procedures  ECG 12 Lead Documentation Only    Date/Time: 1/23/2023 3:38 PM  Performed by: Kathy Robledo PA-C  Authorized by: Kathy Robledo PA-C     ECG reviewed by me, the ED Provider: yes    Patient location:  ED  Interpretation:     Interpretation: normal    Quality:     Tracing quality:  Limited by artifact  Rate:     ECG rate:  66    ECG rate assessment: normal    Rhythm:     Rhythm: sinus rhythm    Ectopy:     Ectopy: none    QRS:     QRS axis:  Normal    QRS intervals:  Normal  Conduction:     Conduction: normal    ST segments:     ST segments:  Normal  T waves:     T waves: normal               ED Course  ED Course as of 01/23/23 1827   Mon Jan 23, 2023   1732 hs TnI 0hr: 4  Plan for delta trop and likely d/c             HEART Risk Score    Flowsheet Row Most Recent Value   Heart Score Risk Calculator    History 0 Filed at: 01/23/2023 1826   ECG 1 Filed at: 01/23/2023 1826   Age 1 Filed at: 01/23/2023 1826   Risk Factors 0 Filed at: 01/23/2023 1826   Troponin 0 Filed at: 01/23/2023 1826   HEART Score 2 Filed at: 01/23/2023 [place] Making  DDX including but not limited to: seizure disorder, pseudoseizure, metabolic abnormality, cardiac etiology, syncope, tumor, ICH, other intracranial process, substance abuse, overdose  Plan: CT head  Labs  Dispo pending  MDM: 44-year-old male with seizure  Work-up is unremarkable  CT head negative  No additional seizures while in emergency room  He could be having alcohol withdrawal seizures however he states his last drink was 1 week ago which would be unusual at this point to have withdrawal seizure  Regardless with him being back to baseline, tolerating p o , feels comfortable discharging home for outpatient follow-up    He can see neurology for work-up of his seizures in case they are not related to alcohol use  Patient understands and agrees with this plan  [facility]): acute illness or injury  Amount and/or Complexity of Data Reviewed  Labs: ordered  Decision-making details documented in ED Course  Radiology: ordered  Risk  Prescription drug management  Disposition  Final diagnoses:   Seizure [facility])     Time reflects when diagnosis was documented in both MDM as applicable and the Disposition within this note     Time User Action Codes Description Comment    1/23/2023  6:23 PM Nikki Barnhart Add [R56 9] [facility])       ED Disposition     ED Disposition   Discharge    Condition   Stable    Date/Time   Mon Jan 23, 2023  6:23 PM    Comment   Florentin Knapp discharge to home/self care                 Follow-up Information     Follow up With Specialties Details Why Contact Info Additional Information    Neurology [facility]   [address] [place] [address]  [phone] Neurology [facility], [address] [place] [address], [address] [place] [address],[address]          Patient's Medications    No medications on file           PDMP Review       Value Time User    PDMP Reviewed  Yes 3/9/2020  4:47 PM Stephanie Denney MD          ED Provider  Electronically Signed by           Solitario Gutierrez PA-C  01/23/23 3247

## 2023-01-23 NOTE — DISCHARGE INSTRUCTIONS
Return to the Emergency Department sooner if increased seizures, pain, fever, vomiting, lethargy, difficulty breathing or urinating, weakness  No driving

## 2023-01-24 LAB
ATRIAL RATE: 48 BPM
ATRIAL RATE: 66 BPM
P AXIS: 60 DEGREES
P AXIS: 61 DEGREES
PR INTERVAL: 152 MS
PR INTERVAL: 172 MS
QRS AXIS: 83 DEGREES
QRS AXIS: 84 DEGREES
QRSD INTERVAL: 96 MS
QRSD INTERVAL: 98 MS
QT INTERVAL: 420 MS
QT INTERVAL: 474 MS
QTC INTERVAL: 423 MS
QTC INTERVAL: 440 MS
T WAVE AXIS: 58 DEGREES
T WAVE AXIS: 60 DEGREES
VENTRICULAR RATE: 48 BPM
VENTRICULAR RATE: 66 BPM